# Patient Record
Sex: FEMALE | Race: WHITE | NOT HISPANIC OR LATINO | Employment: FULL TIME | URBAN - NONMETROPOLITAN AREA
[De-identification: names, ages, dates, MRNs, and addresses within clinical notes are randomized per-mention and may not be internally consistent; named-entity substitution may affect disease eponyms.]

---

## 2017-02-15 ENCOUNTER — COMMUNICATION - GICH (OUTPATIENT)
Dept: FAMILY MEDICINE | Facility: OTHER | Age: 32
End: 2017-02-15

## 2017-02-15 DIAGNOSIS — F40.01 AGORAPHOBIA WITH PANIC DISORDER: ICD-10-CM

## 2017-02-15 DIAGNOSIS — F41.9 ANXIETY DISORDER: ICD-10-CM

## 2017-03-20 ENCOUNTER — OFFICE VISIT - GICH (OUTPATIENT)
Dept: FAMILY MEDICINE | Facility: OTHER | Age: 32
End: 2017-03-20

## 2017-03-20 DIAGNOSIS — F41.9 ANXIETY DISORDER: ICD-10-CM

## 2017-03-20 DIAGNOSIS — F33.42 MAJOR DEPRESSIVE DISORDER, RECURRENT, IN FULL REMISSION (H): ICD-10-CM

## 2017-03-20 DIAGNOSIS — F40.01 AGORAPHOBIA WITH PANIC DISORDER: ICD-10-CM

## 2017-03-20 ASSESSMENT — ANXIETY QUESTIONNAIRES
GAD7 TOTAL SCORE: 0
1. FEELING NERVOUS, ANXIOUS, OR ON EDGE: NOT AT ALL
5. BEING SO RESTLESS THAT IT IS HARD TO SIT STILL: NOT AT ALL
7. FEELING AFRAID AS IF SOMETHING AWFUL MIGHT HAPPEN: NOT AT ALL
2. NOT BEING ABLE TO STOP OR CONTROL WORRYING: NOT AT ALL
6. BECOMING EASILY ANNOYED OR IRRITABLE: NOT AT ALL
4. TROUBLE RELAXING: NOT AT ALL
3. WORRYING TOO MUCH ABOUT DIFFERENT THINGS: NOT AT ALL

## 2017-03-20 ASSESSMENT — PATIENT HEALTH QUESTIONNAIRE - PHQ9: SUM OF ALL RESPONSES TO PHQ QUESTIONS 1-9: 0

## 2018-01-03 NOTE — NURSING NOTE
Patient Information     Patient Name MRN Bev Jacobs [id number] Female 1985      Nursing Note by Faby Penaloza at 3/20/2017  9:30 AM     Author:  Faby Penaloza Service:  (none) Author Type:  (none)     Filed:  3/20/2017  9:54 AM Encounter Date:  3/20/2017 Status:  Signed     :  Faby Penaloza            Patient presents today for a medication check. She states things are going well, no concerns.  Faby Penaloza LPN ....................  3/20/2017   9:32 AM

## 2018-01-03 NOTE — TELEPHONE ENCOUNTER
Patient Information     Patient Name MRN Bev Jacobs [id number] Female 1985      Telephone Encounter by Jesus Hernandez RN at 2/15/2017 11:51 AM     Author:  Jesus Hernandez RN Service:  (none) Author Type:  NURS- Registered Nurse     Filed:  2/15/2017 12:08 PM Encounter Date:  2/15/2017 Status:  Signed     :  Jesus Hernandez RN (NURS- Registered Nurse)            Last office visit with PCP to address continued use of prozac noted to be on 2016. Plan as follows:    continue with fluoxetine 20 mg daily. Okay to use one half to one tablet Klonopin at bedtime as it seems to mitigate her nighttime anxiety. Weaned down on the Klonopin over the course of the next month and then leave the fluoxetine at the stable dose for at least 6 months with follow-up in the next 3-6 months, sooner if needed    Current rx in chart not reflective of this dosing, however patient is also not due for a refill of paxil at this time either as per last refill in chart as listed below:    Prescribing Provider: Karyna Ortiz MD                 Order Date: 2016  Ordered by: KARYNA ORTIZ V  Medication:FLUoxetine (PROZAC) 10 mg capsule    Qty:90 capsule  Ref:11  Start:2016    End:              Route:Oral                  CRIS:No   Class:eRx    Sig:Take 3 capsules by mouth every morning.    Patient taking differently: Take 20 mg by mouth at bedtime.    Pharmacy:[address]    Writer will refuse requested rx as redundant and send patient a reminder letter as she is due for a follow up visit.     Unable to complete prescription refill per RN Medication Refill Policy.................... Jesus Hernandez RN ....................  2/15/2017   11:58 AM

## 2018-01-03 NOTE — PROGRESS NOTES
Patient Information     Patient Name MRN Bev Jacobs [id number] Female 1985      Progress Notes by Alfred Ortiz MD at 3/20/2017  9:30 AM     Author:  Alfred Ortiz MD Service:  (none) Author Type:  Physician     Filed:  3/20/2017 10:11 AM Encounter Date:  3/20/2017 Status:  Signed     :  Alfred Ortiz MD (Physician)            Nursing Notes:   Faby Penaloza  3/20/2017  9:54 AM  Signed  Patient presents today for a medication check. She states things are going well, no concerns.  Faby Penaloza LPN ....................  3/20/2017   9:32 AM      SUBJECTIVE:  Bev KOHLER  is a 31 y.o. female who comes in today for follow-up and medication check. She is doing well and has no specific concerns. She is on fluoxetine daily. She also uses Klonopin when necessary. She last filled Klonopin in late 2016.     PHQ Depression Screening 2016 3/20/2017   Date of PHQ exam (doc flow) 2016 3/20/2017   1. Lack of interest/pleasure - 0 - Not at all   2. Feeling down/depressed - 0 - Not at all   PHQ-2 TOTAL SCORE - 0   3. Trouble sleeping - 0 - Not at all   4. Decreased energy - 0 - Not at all   5. Appetite change - 0 - Not at all   6. Feelings of failure - 0 - Not at all   7. Trouble concentrating - 0 - Not at all   8. Activity level - 0 - Not at all   9. Hurting yourself - 0 - Not at all   PHQ-9 TOTAL SCORE - 0   PHQ-9 Severity Level - none   Functional Impairment - not applicable      LAI-7 ANXIETY SCREENING 2016 3/20/2017   LAI date (doc flow) 2016 3/20/2017   Nervous, anxious 1 0   Cannot stop worrying 1 0   Worry about different things 1 0   Cannot relax 1 0   Feeling restless 1 0   Easily annoyed/irritated 1 0   Afraid of awful event 1 0   Score 7 0   Severity mild anxiety none        She is up-to-date on health maintenance issues. She is status post hysterectomy. She is up-to-date on immunizations with the exception of TDAP. Did get her flu shot this  year.    She fell over the weekend and has been taking Flexeril.     Past Medical, Family, and Social History reviewed and updated as noted below.   ROS is negative except as noted above       Allergies     Allergen  Reactions     Hydrocodone-Acetaminophen Shortness Of Breath and Anxiety     Amoxicillin Rash     Codeine Nausea Only     Pcn [Penicillins] Rash     Piperacillin-Tazobactam Rash   , No family history on file.,   Current Outpatient Prescriptions on File Prior to Visit       Medication  Sig Dispense Refill     clonazePAM (KLONOPIN) 0.5 mg tablet Take 1/2-1 tablet by mouth twice daily prn 60 tablet 2     multivitamin (MVI) tablet Take 1 tablet by mouth once daily.  0     ondansetron (ZOFRAN ODT) 4 mg disintegrating tablet Place 1 tablet on the tongue every 8 hours if needed for Nausea/Vomiting. 30 tablet 0     No current facility-administered medications on file prior to visit.    ,   Past Medical History      Diagnosis   Date     Atrial septal defect       Venous-type      Hx of pregnancy             Lump or mass in breast       Pain in joint, upper arm       Strep pharyngitis       intermittent    ,   Patient Active Problem List      Diagnosis Date Noted     Panic disorder with agoraphobia 2016     Vitamin D insufficiency 2015     Depression, acute 2015     Anxiety 2015     Vasovagal near syncope 2015     Chest pain, unspecified 2015     Painful respiration 2015     Migraine without aura, without mention of intractable migraine without mention of status migrainosus 2015     Enlargement of lymph nodes 2015     Other malaise and fatigue 2015     Postoperative state 2015     Pelvic abscess in female 2015     GERD (gastroesophageal reflux disease) 2014     H/O atrial septal defect repair 2014   ,   Past Surgical History       Procedure   Laterality Date     Pericardial patch        repair of a sinus/venus type  atrioseptal defect       Ovarian cystectomy   2013     Dr. Martinez, [facility]       Pr lap tubal cautery   3/10/2015             Exploratory laparotomy, release of small bowel obstruction and drainage of pelvic abscess   3/14/2015     Appendectomy   3/10/15     Appendectomy       Tubal ligation   3/10/15    and   Social History        Substance Use Topics          Smoking status:   Light Tobacco Smoker      Packs/day:  0.10      Types:  Cigarettes      Smokeless tobacco:   Never Used      Alcohol use   0.0 oz/week     0 Standard drinks or equivalent per week        Comment: occ       OBJECTIVE:  Visit Vitals       /70     Pulse 58     Wt 62 kg (136 lb 9.6 oz)     LMP 02/19/2016 (Exact Date)     BMI 23.45 kg/m2      EXAM:  General Appearance: Pleasant, alert, appropriate appearance for age. No acute distress  Head Exam: Normal. Normocephalic, atraumatic.  Eye Exam: PERRLA, EOMI, conjunctiva, sclerae normal.  Ear Exam: Normal TM's bilaterally. Normal auditory canals and external ears. Non-tender.  Nose Exam: Normal external nose, mucus membranes, and septum.  OroPharynx Exam:  Dental hygiene adequate. Normal buccal mucose. Normal pharynx.  Neck Exam:  Supple, no masses or nodes. No bruits  Thyroid Exam: No nodules or enlargement.  Chest/Respiratory Exam: Normal chest wall and respirations. Clear to auscultation.  Cardiovascular Exam: Regular rate and rhythm. S1, S2, no murmur, click, gallop, or rubs.  Gastrointestinal Exam: Soft, non-tender, no masses or organomegaly.  Lymphatic Exam: Non-palpable nodes in neck, clavicular regions.  Musculoskeletal Exam: Back is straight and non-tender, full ROM of upper and lower extremities.  Foot Exam: Left and right foot: good pedal pulses  Skin: no rash or abnormalities  Neurologic Exam:  normal gross motor, tone coordination and no tremor.  Psychiatric Exam: Alert and oriented - appropriate affect. No formal thought disorder.   ASSESSMENT/Plan :      Bev was seen  today for medication management.    Diagnoses and all orders for this visit:    Anxiety  -     FLUoxetine (PROZAC) 20 mg capsule; Take 1 capsule by mouth every morning.    Panic disorder with agoraphobia  -     FLUoxetine (PROZAC) 20 mg capsule; Take 1 capsule by mouth every morning.    Recurrent major depressive disorder, in full remission (HC)    Continue current medications. Discussed diet, exercise and healthy lifestyle changes.     Follow up in one year, sooner prn.     A total of 15 minutes was spent with the patient, greater than 50% of the time was spent in counseling/discussion of the aforementioned concerns.       Alfred Ortiz MD

## 2018-01-03 NOTE — TELEPHONE ENCOUNTER
Patient Information     Patient Name MRN Bev Jacobs [id number] Female 1985      Telephone Encounter by Jesus Hernandez RN at 2/15/2017 12:23 PM     Author:  Jesus Hernandez RN Service:  (none) Author Type:  NURS- Registered Nurse     Filed:  2/15/2017 12:40 PM Encounter Date:  2/15/2017 Status:  Signed     :  Jesus Hernandez RN (NURS- Registered Nurse)            Received additional request for patient's prozac as listed in encounter. Writer had just spoken to [place] pharmacist at [facility] about requesting a transfer from [facility] for this medication. See previous refill request. Call placed to [place] at [facility], who advised writer that previous rx for prozac 10 mg capsules written on 16 had been transferred out of [facility] per their report, and that he couldn't obtain a transfer. Writer will refill rx as requested for a limited supply at prozac 20 mg-1 capsule daily as per office visit note instructions on 16. Reminder letter previously sent to patient to day with previous encounter.    Depression-in adults 18 and over  SSRI    Office visit in the past 12 months or as indicated in chart.  Should have clinic visit 1-2 months after initial prescription.    Last visit with KARYNA WEINSTEIN was on: 2016 in [facility]  Next visit with KARYNA WEINSTEIN is on: No future appointment listed with this provider  Next visit with Family Practice is on: No future appointment listed in this department    Max refills 12 months from last office visit or per providers notes.    PHQ Depression Screening 2016   Date of PHQ exam (doc flow) 2016   1. Lack of interest/pleasure 0 - Not at all -   2. Feeling down/depressed 1 - Several days -   PHQ-2 TOTAL SCORE 1 -   3. Trouble sleeping 0 - Not at all -   4. Decreased energy 1 - Several days -   5. Appetite change 0 - Not at all -   6. Feelings of failure 1 - Several days -   7. Trouble concentrating 1 - Several days -   8.  Activity level 0 - Not at all -   9. Hurting yourself 0 - Not at all -   PHQ-9 TOTAL SCORE 4 -   PHQ-9 Severity Level none -   Functional Impairment somewhat difficult -     Prescription refilled per RN Medication Refill Policy.................... Jesus Hernandez RN ....................  2/15/2017   12:37 PM

## 2018-01-03 NOTE — TELEPHONE ENCOUNTER
Patient Information     Patient Name MRN Sex Bev SHRESTHA [id number] Female 1985      Telephone Encounter by Jesus Hernandez RN at 2/15/2017 12:04 PM     Author:  Jesus Hernandez RN Service:  (none) Author Type:  NURS- Registered Nurse     Filed:  2/15/2017 12:08 PM Encounter Date:  2/15/2017 Status:  Signed     :  Jesus Hernandez RN (NURS- Registered Nurse)            After writer had refused refill request for prozac, noted that [facility]. Previous rx was sent to [facility] in [place]. Call placed to [facility]. Spoke with [place], pharmacist. Requested that he obtain a transfer from [facility] in [employer]. [place] stated understanding.    Unable to complete prescription refill per RN Medication Refill Policy.................... Jesus Hernandez RN ....................  2/15/2017   12:08 PM

## 2018-01-16 RX ORDER — ONDANSETRON 4 MG/1
4 TABLET, ORALLY DISINTEGRATING ORAL
COMMUNITY
Start: 2016-10-24 | End: 2018-08-13

## 2018-01-16 RX ORDER — CLONAZEPAM 0.5 MG/1
TABLET ORAL
COMMUNITY
Start: 2016-06-07 | End: 2018-08-13

## 2018-01-16 RX ORDER — DIPHENOXYLATE HYDROCHLORIDE AND ATROPINE SULFATE 2.5; .025 MG/1; MG/1
1 TABLET ORAL
COMMUNITY
Start: 2016-01-25 | End: 2018-08-13

## 2018-01-26 VITALS — HEART RATE: 58 BPM | DIASTOLIC BLOOD PRESSURE: 70 MMHG | SYSTOLIC BLOOD PRESSURE: 115 MMHG | WEIGHT: 136.6 LBS

## 2018-02-01 ASSESSMENT — ANXIETY QUESTIONNAIRES: GAD7 TOTAL SCORE: 0

## 2018-02-01 ASSESSMENT — PATIENT HEALTH QUESTIONNAIRE - PHQ9: SUM OF ALL RESPONSES TO PHQ QUESTIONS 1-9: 0

## 2018-03-25 ENCOUNTER — HEALTH MAINTENANCE LETTER (OUTPATIENT)
Age: 33
End: 2018-03-25

## 2018-04-30 ENCOUNTER — APPOINTMENT (OUTPATIENT)
Dept: GENERAL RADIOLOGY | Facility: OTHER | Age: 33
End: 2018-04-30
Attending: FAMILY MEDICINE
Payer: MEDICAID

## 2018-04-30 ENCOUNTER — HOSPITAL ENCOUNTER (EMERGENCY)
Facility: OTHER | Age: 33
Discharge: HOME | End: 2018-05-01
Admitting: PHYSICIAN ASSISTANT
Payer: MEDICAID

## 2018-04-30 DIAGNOSIS — S49.92XA ARM INJURY, LEFT, INITIAL ENCOUNTER: ICD-10-CM

## 2018-04-30 PROCEDURE — 99284 EMERGENCY DEPT VISIT MOD MDM: CPT | Mod: 25 | Performed by: PHYSICIAN ASSISTANT

## 2018-04-30 PROCEDURE — 73090 X-RAY EXAM OF FOREARM: CPT | Mod: LT

## 2018-04-30 PROCEDURE — 99283 EMERGENCY DEPT VISIT LOW MDM: CPT | Mod: Z6 | Performed by: PHYSICIAN ASSISTANT

## 2018-04-30 PROCEDURE — 73080 X-RAY EXAM OF ELBOW: CPT | Mod: LT

## 2018-04-30 NOTE — ED AVS SNAPSHOT
[facility]    [address]    [place] [address]    Phone:  [phone]    Fax:  [phone]                                       Bev Shi   MRN: [id number]    Department:  [facility]   Date of Visit:  4/30/2018           After Visit Summary Signature Page     I have received my discharge instructions, and my questions have been answered. I have discussed any challenges I see with this plan with the nurse or doctor.    ..........................................................................................................................................  Patient/Patient Representative Signature      ..........................................................................................................................................  Patient Representative Print Name and Relationship to Patient    ..................................................               ................................................  Date                                            Time    ..........................................................................................................................................  Reviewed by Signature/Title    ...................................................              ..............................................  Date                                                            Time

## 2018-04-30 NOTE — ED AVS SNAPSHOT
[facility]    [address]    [place] [address]    Phone:  [phone]    Fax:  [phone]                                       Bev Shi   MRN: [id number]    Department:  [facility]   Date of Visit:  4/30/2018           Patient Information     Date Of Birth          1985        Your diagnoses for this visit were:     Arm injury, left, initial encounter       Follow-up Information     Follow up with Alfred Ortiz MD.    Specialty:  Family Practice    Contact information:    [address] [place] [address]  [phone]          Discharge Instructions       Splint for comfort. Ibuprofen 600-800 mg every 6 hours. Return for concerns.  Follow-up in clinic as needed.    24 Hour Appointment Hotline       To make an appointment at any [facility], call [phone] ([phone]). If you don't have a family doctor or clinic, we will help you find one. [facility] clinics are conveniently located to serve the needs of you and your family.             Review of your medicines      Our records show that you are taking the medicines listed below. If these are incorrect, please call your family doctor or clinic.        Dose / Directions Last dose taken    clonazePAM 0.5 MG tablet   Commonly known as:  klonoPIN        Take 1/2-1 tablet by mouth twice daily prn   Refills:  0        FLUoxetine 20 MG capsule   Commonly known as:  PROzac   Dose:  20 mg        Take 20 mg by mouth   Refills:  0        MULTI-VITAMINS Tabs   Dose:  1 tablet        Take 1 tablet by mouth   Refills:  0        ondansetron 4 MG ODT tab   Commonly known as:  ZOFRAN-ODT   Dose:  4 mg        Place 4 mg under the tongue   Refills:  0                Procedures and tests performed during your visit     XR Elbow Left G/E 3 Views    XR Forearm Left 2 Views      Orders Needing Specimen Collection     None      Pending Results     Date and Time Order Name Status  "Description    2018 2323 XR Elbow Left G/E 3 Views In process     2018 2323 XR Forearm Left 2 Views In process             Pending Culture Results     No orders found for last 3 day(s).            Pending Results Instructions     If you had any lab results that were not finalized at the time of your Discharge, you can call the ED Lab Result RN at [phone]. You will be contacted by this team for any positive Lab results or changes in treatment. The nurses are available 7 days a week from 10A to 6:30P.  You can leave a message 24 hours per day and they will return your call.        Thank you for choosing [facility]       Thank you for choosing [facility] for your care. Our goal is always to provide you with excellent care. Hearing back from our patients is one way we can continue to improve our services. Please take a few minutes to complete the written survey that you may receive in the mail after you visit with us. Thank you!        [employer] Information     [facility] lets you send messages to your doctor, view your test results, renew your prescriptions, schedule appointments and more. To sign up, go to [email or web address] . Click on \"Log in\" on the left side of the screen, which will take you to the Welcome page. Then click on \"Sign up Now\" on the right side of the page.     You will be asked to enter the access code listed below, as well as some personal information. Please follow the directions to create your username and password.     Your access code is: [email or web address]  Expires: 2018 12:18 AM     Your access code will  in 90 days. If you need help or a new code, please call your [facility] clinic or [phone].        Care EveryWhere ID     This is your Care EveryWhere ID. This could be used by other organizations to access your [facility] medical records  [id number]        Equal Access to Services     JORGITO BLANTON : sotero Doll qaybta kaalmada adeegyada, " riya zavala'aan ah. So [facility] [phone].    ATENCIÓN: Si habla español, tiene a del valle disposición servicios gratuitos de asistencia lingüística. Llame al [phone].    We comply with applicable federal civil rights laws and [place] laws. We do not discriminate on the basis of race, color, national origin, age, disability, sex, sexual orientation, or gender identity.            After Visit Summary       This is your record. Keep this with you and show to your community pharmacist(s) and doctor(s) at your next visit.

## 2018-05-01 VITALS
SYSTOLIC BLOOD PRESSURE: 136 MMHG | RESPIRATION RATE: 18 BRPM | HEIGHT: 65 IN | DIASTOLIC BLOOD PRESSURE: 75 MMHG | TEMPERATURE: 97.3 F | OXYGEN SATURATION: 100 %

## 2018-05-01 NOTE — DISCHARGE INSTRUCTIONS
Splint for comfort. Ibuprofen 600-800 mg every 6 hours. Return for concerns.  Follow-up in clinic as needed.

## 2018-05-01 NOTE — ED PROVIDER NOTES
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  She has numbness and tingling on the ulnar aspect of the arm down to the left pinky.  He has pain with pronation supination of the hand.  Pain with flexion and extension of the wrist.  This is all left-sided.  She denies any neck pain headache or loss of consciousness.    Problem List:    Patient Active Problem List    Diagnosis Date Noted     Panic disorder with agoraphobia 01/25/2016     Priority: Medium     Vitamin D insufficiency 12/28/2015     Priority: Medium     Anxiety 12/07/2015     Priority: Medium     Depression, acute 12/07/2015     Priority: Medium     Vasovagal near syncope 12/07/2015     Priority: Medium     Chest pain 07/09/2015     Priority: Medium     Enlarged lymph nodes 07/09/2015     Priority: Medium     Migraine without aura 07/09/2015     Priority: Medium     Malaise and fatigue 07/09/2015     Priority: Medium     Painful respiration 07/09/2015     Priority: Medium     Postoperative state 03/23/2015     Priority: Medium     Pelvic abscess in female 03/14/2015     Priority: Medium     GERD (gastroesophageal reflux disease) 04/30/2014     Priority: Medium     H/O atrial septal defect repair 04/04/2014     Priority: Medium        Past Medical History:    Past Medical History:   Diagnosis Date     Atrial septal defect      Breast lump      Pain in elbow      Personal history of other medical treatment (CODE)      Streptococcal pharyngitis        Past Surgical History:    Past Surgical History:   Procedure Laterality Date     APPENDECTOMY OPEN      3/10/15,Appendectomy     LAPAROSCOPIC TUBAL LIGATION      3/10/15     OTHER SURGICAL HISTORY       "11/98,715108,OTHER,repair of a sinus/venus type atrioseptal defect     OTHER SURGICAL HISTORY      2013,403202,OTHER,Dr. Martinez, [facility]     OTHER SURGICAL HISTORY      3/10/2015,54271.0,[place] LAP TUBAL CAUTERY     OTHER SURGICAL HISTORY      3/14/2015,634644,OTHER       Family History:    No family history on file.    Social History:  Marital Status:   [4]  Social History   Substance Use Topics     Smoking status: Light Tobacco Smoker     Packs/day: 0.10     Types: Cigarettes     Smokeless tobacco: Never Used     Alcohol use 0.0 oz/week      Comment: Alcoholic Drinks/day: occ        Medications:      clonazePAM (KLONOPIN) 0.5 MG tablet   FLUoxetine (PROZAC) 20 MG capsule   Multiple Vitamin (MULTI-VITAMINS) TABS   ondansetron (ZOFRAN-ODT) 4 MG ODT tab         Review of Systems  HPI  Physical Exam   BP: 142/82  Heart Rate: 76  Temp: 97.3  F (36.3  C)  Resp: 16  Height: 165.1 cm (5' 5\")  SpO2: 100 %      Physical Exam   Constitutional: She is oriented to person, place, and time. She appears well-developed and well-nourished. No distress.   Neck:   No midline cervical spinal tenderness   Cardiovascular: Normal rate.    Pulmonary/Chest: Effort normal. No respiratory distress.   Musculoskeletal: She exhibits tenderness. She exhibits no edema or deformity.   Range of motion is limited pain.  She she is tender in the canal where the ulnar nerve and pass along the elbow.  She has some paresthesia to the fifth digit and fourth digit as well.   Neurological: She is alert and oriented to person, place, and time.   Skin: Skin is warm and dry. She is not diaphoretic.   Psychiatric: She has a normal mood and affect. Her behavior is normal. Judgment and thought content normal.   Nursing note and vitals reviewed.      ED Course   She was examined here in the ED.  We did do x-rays of the forearm and elbow that both negative.  Her tenderness is basic basically at the wrist area.  She placed a Velcro splint.  Take " ibuprofen as well.  Ice elevate rest.  ED Course     Procedures                 No results found for this or any previous visit (from the past 24 hour(s)).    Medications - No data to display    Assessments & Plan (with Medical Decision Making)     I have reviewed the nursing notes.    I have reviewed the findings, diagnosis, plan and need for follow up with the patient.      New Prescriptions    No medications on file       Final diagnoses:   Arm injury, left, initial encounter       4/30/2018   [facility]  05/01/18 0048

## 2018-05-01 NOTE — ED TRIAGE NOTES
Patient presents ambulatory to ER. She reports numbness and tingling that began in her L forearm and has since spread from her mid upper arm to her hand. 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

## 2018-07-23 NOTE — PROGRESS NOTES
Patient Information     Patient Name  Bev KOHLER MRN  [id number] Sex  Female   1985      Letter by Alfred Ortiz MD at      Author:  Alfred Ortiz MD Service:  (none) Author Type:  (none)    Filed:   Encounter Date:  2/15/2017 Status:  (Other)           Bev KOHLER  [address]   [place] [address]          February 15, 2017    Dear Ms. KOHLER:    This is to remind you that you are due for your 6 month follow-up appointment with Alfred Ortiz MD in relation to anxiety and continued use of prozac.  Your last visit was on 2016. Additional refills of your medication require you to complete this visit.    Please call [phone] to schedule your appointment.    Thank you for choosing [facility] for your health care needs.    Sincerely,      Refill RN  [facility]

## 2018-08-13 ENCOUNTER — OFFICE VISIT (OUTPATIENT)
Dept: FAMILY MEDICINE | Facility: OTHER | Age: 33
End: 2018-08-13
Attending: FAMILY MEDICINE
Payer: COMMERCIAL

## 2018-08-13 VITALS
BODY MASS INDEX: 22.57 KG/M2 | SYSTOLIC BLOOD PRESSURE: 100 MMHG | DIASTOLIC BLOOD PRESSURE: 82 MMHG | HEART RATE: 72 BPM | WEIGHT: 135.6 LBS

## 2018-08-13 DIAGNOSIS — F43.21 ADJUSTMENT DISORDER WITH DEPRESSED MOOD: ICD-10-CM

## 2018-08-13 DIAGNOSIS — F40.01 PANIC DISORDER WITH AGORAPHOBIA: ICD-10-CM

## 2018-08-13 DIAGNOSIS — Z23 NEED FOR DIPHTHERIA-TETANUS-PERTUSSIS (TDAP) VACCINE, ADULT/ADOLESCENT: ICD-10-CM

## 2018-08-13 DIAGNOSIS — R11.0 NAUSEA: ICD-10-CM

## 2018-08-13 DIAGNOSIS — F41.9 ANXIETY: ICD-10-CM

## 2018-08-13 DIAGNOSIS — F33.41 RECURRENT MAJOR DEPRESSION IN PARTIAL REMISSION (H): Primary | ICD-10-CM

## 2018-08-13 PROCEDURE — 90715 TDAP VACCINE 7 YRS/> IM: CPT | Performed by: FAMILY MEDICINE

## 2018-08-13 PROCEDURE — 90471 IMMUNIZATION ADMIN: CPT | Performed by: FAMILY MEDICINE

## 2018-08-13 PROCEDURE — 99213 OFFICE O/P EST LOW 20 MIN: CPT | Mod: 25 | Performed by: FAMILY MEDICINE

## 2018-08-13 PROCEDURE — G0463 HOSPITAL OUTPT CLINIC VISIT: HCPCS | Performed by: FAMILY MEDICINE

## 2018-08-13 RX ORDER — CLONAZEPAM 0.5 MG/1
TABLET ORAL
Qty: 30 TABLET | Refills: 5 | Status: SHIPPED | OUTPATIENT
Start: 2018-08-13 | End: 2019-01-02

## 2018-08-13 RX ORDER — ONDANSETRON 4 MG/1
4 TABLET, ORALLY DISINTEGRATING ORAL EVERY 8 HOURS PRN
Qty: 20 TABLET | Refills: 1 | Status: SHIPPED | OUTPATIENT
Start: 2018-08-13 | End: 2020-06-01

## 2018-08-13 ASSESSMENT — PATIENT HEALTH QUESTIONNAIRE - PHQ9: 5. POOR APPETITE OR OVEREATING: SEVERAL DAYS

## 2018-08-13 ASSESSMENT — PAIN SCALES - GENERAL: PAINLEVEL: NO PAIN (0)

## 2018-08-13 ASSESSMENT — ANXIETY QUESTIONNAIRES
GAD7 TOTAL SCORE: 8
2. NOT BEING ABLE TO STOP OR CONTROL WORRYING: SEVERAL DAYS
IF YOU CHECKED OFF ANY PROBLEMS ON THIS QUESTIONNAIRE, HOW DIFFICULT HAVE THESE PROBLEMS MADE IT FOR YOU TO DO YOUR WORK, TAKE CARE OF THINGS AT HOME, OR GET ALONG WITH OTHER PEOPLE: SOMEWHAT DIFFICULT
1. FEELING NERVOUS, ANXIOUS, OR ON EDGE: SEVERAL DAYS
3. WORRYING TOO MUCH ABOUT DIFFERENT THINGS: NEARLY EVERY DAY
5. BEING SO RESTLESS THAT IT IS HARD TO SIT STILL: NOT AT ALL
7. FEELING AFRAID AS IF SOMETHING AWFUL MIGHT HAPPEN: SEVERAL DAYS
6. BECOMING EASILY ANNOYED OR IRRITABLE: SEVERAL DAYS

## 2018-08-13 NOTE — MR AVS SNAPSHOT
"              After Visit Summary   8/13/2018    Bev Shi    MRN: [id number]           Patient Information     Date Of Birth          1985        Visit Information        Provider Department      8/13/2018 1:00 PM Alfred Ortiz MD [facility]        Today's Diagnoses     Recurrent major depression in partial remission (H)    -  1    Anxiety        Panic disorder with agoraphobia        Adjustment disorder with depressed mood        Nausea        Need for diphtheria-tetanus-pertussis (Tdap) vaccine, adult/adolescent           Follow-ups after your visit        Who to contact     If you have questions or need follow up information about today's clinic visit or your schedule please contact [facility] directly at [phone].  Normal or non-critical lab and imaging results will be communicated to you by M[employer]art, letter or phone within 4 business days after the clinic has received the results. If you do not hear from us within 7 days, please contact the clinic through Miralupahart or phone. If you have a critical or abnormal lab result, we will notify you by phone as soon as possible.  Submit refill requests through [employer] or call your pharmacy and they will forward the refill request to us. Please allow 3 business days for your refill to be completed.          Additional Information About Your Visit        MyChart Information     [employer] lets you send messages to your doctor, view your test results, renew your prescriptions, schedule appointments and more. To sign up, go to [email or web address] . Click on \"Log in\" on the left side of the screen, which will take you to the Welcome page. Then click on \"Sign up Now\" on the right side of the page.     You will be asked to enter the access code listed below, as well as some personal information. Please follow the directions to create your username and password.     Your access code is: [email or web address]  Expires: " 2018  5:55 PM     Your access code will  in 90 days. If you need help or a new code, please call your [facility] clinic or [phone].        Care EveryWhere ID     This is your Care EveryWhere ID. This could be used by other organizations to access your [facility] medical records  [id number]        Your Vitals Were     Pulse Breastfeeding? BMI (Body Mass Index)             72 No 22.57 kg/m2          Blood Pressure from Last 3 Encounters:   18 100/82   18 136/75   17 115/70    Weight from Last 3 Encounters:   18 135 lb 9.6 oz (61.5 kg)   17 136 lb 9.6 oz (62 kg)   16 125 lb (56.7 kg)              We Performed the Following     GH IMM-  TDAP VACCINE (BOOSTRIX )          Today's Medication Changes          These changes are accurate as of 18  5:55 PM.  If you have any questions, ask your nurse or doctor.               These medicines have changed or have updated prescriptions.        Dose/Directions    * FLUoxetine 20 MG capsule   Commonly known as:  PROzac   This may have changed:  Another medication with the same name was added. Make sure you understand how and when to take each.   Changed by:  Alfred Ortiz MD        Dose:  20 mg   Take 20 mg by mouth   Refills:  0       * FLUoxetine 20 MG capsule   Commonly known as:  PROzac   This may have changed:  You were already taking a medication with the same name, and this prescription was added. Make sure you understand how and when to take each.   Used for:  Recurrent major depression in partial remission (H), Anxiety, Panic disorder with agoraphobia   Changed by:  Alfred Ortiz MD        Dose:  20 mg   Take 1 capsule (20 mg) by mouth daily   Quantity:  90 capsule   Refills:  11       ondansetron 4 MG ODT tab   Commonly known as:  ZOFRAN-ODT   This may have changed:    - when to take this  - reasons to take this   Used for:  Nausea   Changed by:  Alfred Ortiz MD        Dose:  4 mg   Place 1 tablet (4 mg)  under the tongue every 8 hours as needed for nausea   Quantity:  20 tablet   Refills:  1       * Notice:  This list has 2 medication(s) that are the same as other medications prescribed for you. Read the directions carefully, and ask your doctor or other care provider to review them with you.         Where to get your medicines      These medications were sent to [id number] - [place], [place] - [address]  [address], [facility] [address]     Phone:  [phone]     FLUoxetine 20 MG capsule    ondansetron 4 MG ODT tab         Some of these will need a paper prescription and others can be bought over the counter.  Ask your nurse if you have questions.     Bring a paper prescription for each of these medications     clonazePAM 0.5 MG tablet                Primary Care Provider Office Phone # Fax #    Alfred Ortiz [phone]       [address]  [facility] [address]        Equal Access to Services     JORGITO BLANTON AH: Hadii aad ku hadasho Soomaali, waaxda luqadaha, qaybta kaalmada adeegyada, waxay orionin haylyssan chris mehta . So [facility] [phone].    ATENCIÓN: Si habla español, tiene a del valle disposición servicios gratuitos de asistencia lingüística. Llame al [phone].    We comply with applicable federal civil rights laws and [place] laws. We do not discriminate on the basis of race, color, national origin, age, disability, sex, sexual orientation, or gender identity.            Thank you!     Thank you for choosing [facility] [place]  for your care. Our goal is always to provide you with excellent care. Hearing back from our patients is one way we can continue to improve our services. Please take a few minutes to complete the written survey that you may receive in the mail after your visit with us. Thank you!             Your Updated Medication List - Protect others around you: Learn how to safely use, store and  throw away your medicines at www.disposemymeds.org.          This list is accurate as of 8/13/18  5:55 PM.  Always use your most recent med list.                   Brand Name Dispense Instructions for use Diagnosis    clonazePAM 0.5 MG tablet    klonoPIN    30 tablet    Take 1/2-1 tablet by mouth twice daily prn    Anxiety, Panic disorder with agoraphobia       * FLUoxetine 20 MG capsule    PROzac     Take 20 mg by mouth        * FLUoxetine 20 MG capsule    PROzac    90 capsule    Take 1 capsule (20 mg) by mouth daily    Recurrent major depression in partial remission (H), Anxiety, Panic disorder with agoraphobia       ondansetron 4 MG ODT tab    ZOFRAN-ODT    20 tablet    Place 1 tablet (4 mg) under the tongue every 8 hours as needed for nausea    Nausea       * Notice:  This list has 2 medication(s) that are the same as other medications prescribed for you. Read the directions carefully, and ask your doctor or other care provider to review them with you.

## 2018-08-13 NOTE — NURSING NOTE
"Chief Complaint   Patient presents with     Recheck Medication       Initial /82 (BP Location: Right arm, Patient Position: Chair, Cuff Size: Adult Regular)  Pulse 72  Wt 135 lb 9.6 oz (61.5 kg)  Breastfeeding? No  BMI 22.57 kg/m2 Estimated body mass index is 22.57 kg/(m^2) as calculated from the following:    Height as of 4/30/18: 5' 5\" (1.651 m).    Weight as of this encounter: 135 lb 9.6 oz (61.5 kg).  Medication Reconciliation: complete    Sindi Stacy LPN  "
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
## 2018-08-14 ASSESSMENT — ANXIETY QUESTIONNAIRES: GAD7 TOTAL SCORE: 8

## 2018-08-14 ASSESSMENT — PATIENT HEALTH QUESTIONNAIRE - PHQ9: SUM OF ALL RESPONSES TO PHQ QUESTIONS 1-9: 6

## 2019-01-02 ENCOUNTER — OFFICE VISIT (OUTPATIENT)
Dept: FAMILY MEDICINE | Facility: OTHER | Age: 34
End: 2019-01-02
Attending: FAMILY MEDICINE
Payer: COMMERCIAL

## 2019-01-02 VITALS
HEART RATE: 76 BPM | DIASTOLIC BLOOD PRESSURE: 66 MMHG | TEMPERATURE: 98.1 F | SYSTOLIC BLOOD PRESSURE: 106 MMHG | HEIGHT: 66 IN | RESPIRATION RATE: 16 BRPM | BODY MASS INDEX: 19.38 KG/M2 | WEIGHT: 120.6 LBS

## 2019-01-02 DIAGNOSIS — F40.01 PANIC DISORDER WITH AGORAPHOBIA: ICD-10-CM

## 2019-01-02 DIAGNOSIS — F33.1 MODERATE RECURRENT MAJOR DEPRESSION (H): Primary | ICD-10-CM

## 2019-01-02 DIAGNOSIS — F41.9 ANXIETY: ICD-10-CM

## 2019-01-02 PROCEDURE — G0463 HOSPITAL OUTPT CLINIC VISIT: HCPCS | Performed by: FAMILY MEDICINE

## 2019-01-02 PROCEDURE — 99213 OFFICE O/P EST LOW 20 MIN: CPT | Performed by: FAMILY MEDICINE

## 2019-01-02 RX ORDER — CLONAZEPAM 0.5 MG/1
TABLET ORAL
Qty: 60 TABLET | Refills: 5 | Status: SHIPPED | OUTPATIENT
Start: 2019-01-02 | End: 2020-06-01

## 2019-01-02 ASSESSMENT — ANXIETY QUESTIONNAIRES
IF YOU CHECKED OFF ANY PROBLEMS ON THIS QUESTIONNAIRE, HOW DIFFICULT HAVE THESE PROBLEMS MADE IT FOR YOU TO DO YOUR WORK, TAKE CARE OF THINGS AT HOME, OR GET ALONG WITH OTHER PEOPLE: SOMEWHAT DIFFICULT
5. BEING SO RESTLESS THAT IT IS HARD TO SIT STILL: NOT AT ALL
1. FEELING NERVOUS, ANXIOUS, OR ON EDGE: MORE THAN HALF THE DAYS
2. NOT BEING ABLE TO STOP OR CONTROL WORRYING: NEARLY EVERY DAY
GAD7 TOTAL SCORE: 8
7. FEELING AFRAID AS IF SOMETHING AWFUL MIGHT HAPPEN: SEVERAL DAYS
3. WORRYING TOO MUCH ABOUT DIFFERENT THINGS: SEVERAL DAYS
6. BECOMING EASILY ANNOYED OR IRRITABLE: NOT AT ALL

## 2019-01-02 ASSESSMENT — MIFFLIN-ST. JEOR: SCORE: 1268.79

## 2019-01-02 ASSESSMENT — PATIENT HEALTH QUESTIONNAIRE - PHQ9
5. POOR APPETITE OR OVEREATING: SEVERAL DAYS
SUM OF ALL RESPONSES TO PHQ QUESTIONS 1-9: 9

## 2019-01-02 ASSESSMENT — PAIN SCALES - GENERAL: PAINLEVEL: NO PAIN (0)

## 2019-01-02 NOTE — NURSING NOTE
"Chief Complaint   Patient presents with     Recheck Medication       Initial /66   Pulse 76   Temp 98.1  F (36.7  C) (Tympanic)   Resp 16   Ht 1.676 m (5' 6\")   Wt 54.7 kg (120 lb 9.6 oz)   Breastfeeding? No   BMI 19.47 kg/m   Estimated body mass index is 19.47 kg/m  as calculated from the following:    Height as of this encounter: 1.676 m (5' 6\").    Weight as of this encounter: 54.7 kg (120 lb 9.6 oz).  Medication Reconciliation: complete    Sindi Stacy LPN  "

## 2019-01-02 NOTE — PROGRESS NOTES
"Nursing Notes:   Sindi Stacy LPN  1/2/2019  9:27 AM  Signed  Chief Complaint   Patient presents with     Recheck Medication       Initial /66   Pulse 76   Temp 98.1  F (36.7  C) (Tympanic)   Resp 16   Ht 1.676 m (5' 6\")   Wt 54.7 kg (120 lb 9.6 oz)   Breastfeeding? No   BMI 19.47 kg/m    Estimated body mass index is 19.47 kg/m  as calculated from the following:    Height as of this encounter: 1.676 m (5' 6\").    Weight as of this encounter: 54.7 kg (120 lb 9.6 oz).  Medication Reconciliation: complete    Sindi Stacy LPN      SUBJECTIVE:  Bev Shi  is a 33 year old female who comes in today for medication recheck.  I last saw her in August. She had been off her antidepressant medication quite a while and had a lot of family stressors.  We resumed fluoxetine at that time as well as clonazepam as a rescue medication.    PHQ-9 SCORE 3/20/2017 8/13/2018 1/2/2019   PHQ-9 Total Score 0 6 9     LAI-7 SCORE 3/20/2017 8/13/2018 1/2/2019   Total Score 0 8 8       Her son Kwabena had a wrist injury from a bullying incident and had to have surgery. She feels better but has lost some weight.  She is taking Klonopin intermittently.     Her mom had new onset seizure disorder likely post concussive. She broke her arm and had surgery.  Later she ended up having seizures.       Past Medical, Family, and Social History reviewed and updated as noted below.   ROS is negative except as noted above       Allergies   Allergen Reactions     Hydrocodone Shortness Of Breath     Amoxicillin Rash     Codeine Nausea     Penicillins Rash     Piperacillin-Tazobactam In D5w Rash   , No family history on file.,   Current Outpatient Medications   Medication     clonazePAM (KLONOPIN) 0.5 MG tablet     FLUoxetine (PROZAC) 20 MG capsule     FLUoxetine (PROZAC) 20 MG capsule     ondansetron (ZOFRAN-ODT) 4 MG ODT tab     No current facility-administered medications for this visit.    ,   Past Medical History:   Diagnosis " "Date     Atrial septal defect     Venous-type     Breast lump     No Comments Provided     Pain in elbow     No Comments Provided     Personal history of other medical treatment (CODE)          Streptococcal pharyngitis     intermittent   ,   Patient Active Problem List    Diagnosis Date Noted     Moderate recurrent major depression (H) 2019     Priority: Medium     Panic disorder with agoraphobia 2016     Priority: Medium     Vitamin D insufficiency 2015     Priority: Medium     Anxiety 2015     Priority: Medium     Migraine without aura 2015     Priority: Medium     GERD (gastroesophageal reflux disease) 2014     Priority: Medium     H/O atrial septal defect repair 2014     Priority: Medium   ,   Past Surgical History:   Procedure Laterality Date     APPENDECTOMY OPEN      3/10/15,Appendectomy     LAPAROSCOPIC HYSTERECTOMY TOTAL, SALPINGECTOMY  2016    Left.  Dr. Martinez     LAPAROSCOPIC TUBAL LIGATION      3/10/15     OTHER SURGICAL HISTORY      ,276238,OTHER,repair of a sinus/venus type atrioseptal defect     OTHER SURGICAL HISTORY      ,302608,OTHER,Dr. Martinez, [facility]     OTHER SURGICAL HISTORY      3/10/2015,53284.0,CT LAP TUBAL CAUTERY     OTHER SURGICAL HISTORY      3/14/2015,503953,OTHER    and   Social History     Tobacco Use     Smoking status: Light Tobacco Smoker     Packs/day: 0.10     Types: Cigarettes     Smokeless tobacco: Never Used   Substance Use Topics     Alcohol use: Yes     Alcohol/week: 0.0 oz     Comment: Alcoholic Drinks/day: occ     OBJECTIVE:  /66   Pulse 76   Temp 98.1  F (36.7  C) (Tympanic)   Resp 16   Ht 1.676 m (5' 6\")   Wt 54.7 kg (120 lb 9.6 oz)   Breastfeeding? No   BMI 19.47 kg/m     EXAM:  Alert and cooperative, no distress.  No formal thought disorder.  No homicidal suicidal ideation affect appears appropriate.    ASSESSMENT/Plan :    Bev was seen today for recheck medication.    Diagnoses " and all orders for this visit:    Moderate recurrent major depression (H)    Anxiety    Panic disorder with agoraphobia      Support and encouragement offered with regard to her multiple psychosocial stressors.  Recommended we continue her on fluoxetine at the current dose.  She is not interested in changing.  Weight loss may be medication related versus stress related.  Discussed use of clonazepam as she has been and renewal is given on same.    A total of 15 minutes was spent with the patient, greater than 50% of the time was spent in counseling/discussion of the aforementioned concerns.     Alfred Ortiz MD

## 2019-01-03 ASSESSMENT — ANXIETY QUESTIONNAIRES: GAD7 TOTAL SCORE: 8

## 2019-12-26 ENCOUNTER — HOSPITAL ENCOUNTER (OUTPATIENT)
Dept: MRI IMAGING | Facility: OTHER | Age: 34
Discharge: HOME | End: 2019-12-26
Attending: ORTHOPAEDIC SURGERY | Admitting: FAMILY MEDICINE
Payer: COMMERCIAL

## 2019-12-26 DIAGNOSIS — M23.91 DERANGEMENT OF RIGHT KNEE: ICD-10-CM

## 2019-12-26 PROCEDURE — 73721 MRI JNT OF LWR EXTRE W/O DYE: CPT | Mod: RT

## 2020-06-01 ENCOUNTER — VIRTUAL VISIT (OUTPATIENT)
Dept: FAMILY MEDICINE | Facility: OTHER | Age: 35
End: 2020-06-01
Attending: FAMILY MEDICINE
Payer: COMMERCIAL

## 2020-06-01 VITALS — BODY MASS INDEX: 21.31 KG/M2 | WEIGHT: 132 LBS

## 2020-06-01 DIAGNOSIS — R11.0 NAUSEA: ICD-10-CM

## 2020-06-01 DIAGNOSIS — F33.1 MODERATE RECURRENT MAJOR DEPRESSION (H): Primary | ICD-10-CM

## 2020-06-01 DIAGNOSIS — F41.9 ANXIETY: ICD-10-CM

## 2020-06-01 DIAGNOSIS — F40.01 PANIC DISORDER WITH AGORAPHOBIA: ICD-10-CM

## 2020-06-01 DIAGNOSIS — F33.41 RECURRENT MAJOR DEPRESSION IN PARTIAL REMISSION (H): ICD-10-CM

## 2020-06-01 PROCEDURE — G0463 HOSPITAL OUTPT CLINIC VISIT: HCPCS | Mod: GT

## 2020-06-01 PROCEDURE — 99214 OFFICE O/P EST MOD 30 MIN: CPT | Mod: 95 | Performed by: FAMILY MEDICINE

## 2020-06-01 RX ORDER — CLONAZEPAM 0.5 MG/1
TABLET ORAL
Qty: 60 TABLET | Refills: 5 | Status: SHIPPED | OUTPATIENT
Start: 2020-06-01 | End: 2020-10-07

## 2020-06-01 RX ORDER — ONDANSETRON 4 MG/1
4 TABLET, ORALLY DISINTEGRATING ORAL EVERY 8 HOURS PRN
Qty: 20 TABLET | Refills: 1 | Status: SHIPPED | OUTPATIENT
Start: 2020-06-01 | End: 2021-09-28

## 2020-06-01 ASSESSMENT — PATIENT HEALTH QUESTIONNAIRE - PHQ9
5. POOR APPETITE OR OVEREATING: MORE THAN HALF THE DAYS
SUM OF ALL RESPONSES TO PHQ QUESTIONS 1-9: 15

## 2020-06-01 ASSESSMENT — ANXIETY QUESTIONNAIRES
1. FEELING NERVOUS, ANXIOUS, OR ON EDGE: NEARLY EVERY DAY
GAD7 TOTAL SCORE: 19
6. BECOMING EASILY ANNOYED OR IRRITABLE: NEARLY EVERY DAY
7. FEELING AFRAID AS IF SOMETHING AWFUL MIGHT HAPPEN: NEARLY EVERY DAY
3. WORRYING TOO MUCH ABOUT DIFFERENT THINGS: NEARLY EVERY DAY
2. NOT BEING ABLE TO STOP OR CONTROL WORRYING: NEARLY EVERY DAY
IF YOU CHECKED OFF ANY PROBLEMS ON THIS QUESTIONNAIRE, HOW DIFFICULT HAVE THESE PROBLEMS MADE IT FOR YOU TO DO YOUR WORK, TAKE CARE OF THINGS AT HOME, OR GET ALONG WITH OTHER PEOPLE: VERY DIFFICULT
5. BEING SO RESTLESS THAT IT IS HARD TO SIT STILL: MORE THAN HALF THE DAYS

## 2020-06-01 ASSESSMENT — PAIN SCALES - GENERAL: PAINLEVEL: NO PAIN (0)

## 2020-06-01 NOTE — LETTER
My Depression Action Plan  Name: Bev Shi   Date of Birth 1985  Date: 6/1/2020    My doctor: Alfred Ortiz   My clinic: [facility]  [address]  [place] [id number] [phone]          GREEN    ZONE   Good Control    What it looks like:     Things are going generally well. You have normal ups and downs. You may even feel depressed from time to time, but bad moods usually last less than a day.   What you need to do:  1. Continue to care for yourself (see self care plan)  2. Check your depression survival kit and update it as needed  3. Follow your physician s recommendations including any medication.  4. Do not stop taking medication unless you consult with your physician first.           YELLOW         ZONE Getting Worse    What it looks like:     Depression is starting to interfere with your life.     It may be hard to get out of bed; you may be starting to isolate yourself from others.    Symptoms of depression are starting to last most all day and this has happened for several days.     You may have suicidal thoughts but they are not constant.   What you need to do:     1. Call your care team. Your response to treatment will improve if you keep your care team informed of your progress. Yellow periods are signs an adjustment may need to be made.     2. Continue your self-care.  Just get dressed and ready for the day.  Don't give yourself time to talk yourself out of it.    3. Talk to someone in your support network.    4. Open up your Depression Self-Care Plan/Wellness Kit.           RED    ZONE Medical Alert - Get Help    What it looks like:     Depression is seriously interfering with your life.     You may experience these or other symptoms: You can t get out of bed most days, can t work or engage in other necessary activities, you have trouble taking care of basic hygiene, or basic responsibilities, thoughts of suicide or death that will  not go away, self-injurious behavior.     What you need to do:  1. Call your care team and request a same-day appointment. If they are not available (weekends or after hours) call your local crisis line, emergency room or 911.            Depression Self-Care Plan / Wellness Kit    Self-Care for Depression  Here s the deal. Your body and mind are really not as separate as most people think.  What you do and think affects how you feel and how you feel influences what you do and think. This means if you do things that people who feel good do, it will help you feel better.  Sometimes this is all it takes.  There is also a place for medication and therapy depending on how severe your depression is, so be sure to consult with your medical provider and/ or Behavioral Health Consultant if your symptoms are worsening or not improving.     In order to better manage my stress, I will:    Exercise  Get some form of exercise, every day. This will help reduce pain and release endorphins, the  feel good  chemicals in your brain. This is almost as good as taking antidepressants!  This is not the same as joining a gym and then never going! (they count on that by the way ) It can be as simple as just going for a walk or doing some gardening, anything that will get you moving.      Hygiene   Maintain good hygiene (get out of bed in the morning, make your bed, brush your teeth, take a shower, and get dressed like you were going to work, even if you are unemployed).  If your clothes don't fit try to get ones that do.    Diet  Strive to eat foods that are good for me, drink plenty of water, and avoid excessive sugar, caffeine, alcohol, and other mood-altering substances.  Some foods that are helpful in depression are: complex carbohydrates, B vitamins, flaxseed, fish or fish oil, fresh fruits and vegetables.    Psychotherapy  Agree to participate in Individual Therapy (if recommended).    Medication  If prescribed medications, I agree to  take them.  Missing doses can result in serious side effects.  I understand that drinking alcohol, or other illicit drug use, may cause potential side effects.  I will not stop my medication abruptly without first discussing it with my provider.    Staying Connected With Others  Stay in touch with my friends, family members, and my primary care provider/team.    Use your imagination  Be creative.  We all have a creative side; it doesn t matter if it s oil painting, sand castles, or mud pies! This will also kick up the endorphins.    Witness Beauty  (AKA stop and smell the roses) Take a look outside, even in mid-winter. Notice colors, textures. Watch the squirrels and birds.     Service to others  Be of service to others.  There is always someone else in need.  By helping others we can  get out of ourselves  and remember the really important things.  This also provides opportunities for practicing all the other parts of the program.    Humor  Laugh and be silly!  Adjust your TV habits for less news and crime-drama and more comedy.    Control your stress  Try breathing deep, massage therapy, biofeedback, and meditation. Find time to relax each day.     Crisis Text Line  http://www.crisistextline.org    The Crisis Text Line serves anyone, in any type of crisis, providing access to free, 24/7 support and information via the medium people already use and trust:    Here's how it works:  1.  Text 374-717 from anywhere in the [place], anytime, about any type of crisis.  2.  A live, trained Crisis Counselor receives the text and responds quickly.  3.  The volunteer Crisis Counselor will help you move from a 'hot moment to a cool moment'.    My support system    Clinic Contact:  Phone number:    Contact 1:  Phone number:    Contact 2:  Phone number:    Spiritism/:  Phone number:    Therapist:  Phone number:    Local crisis center:    Phone number:    Other community support:  Phone number:

## 2020-06-01 NOTE — NURSING NOTE
"Chief Complaint   Patient presents with     Recheck Medication   She had stopped her Prozac. She has been needing to take her Clonazepam twice daily all the time and noticed her depression and anxiety increasing. She start back on the Prozac 2 weeks ago.  Sindi Stacy LPN..................6/1/2020   1:45 PM      Initial Wt 59.9 kg (132 lb)   Breastfeeding No   BMI 21.31 kg/m   Estimated body mass index is 21.31 kg/m  as calculated from the following:    Height as of 1/2/19: 1.676 m (5' 6\").    Weight as of this encounter: 59.9 kg (132 lb).  Medication Reconciliation: complete    Sindi Stacy LPN  "

## 2020-06-01 NOTE — PROGRESS NOTES
"Bev Shi is a 34 year old female who is being evaluated via a billable video visit.      The patient has been notified of following:     \"This video visit will be conducted via a call between you and your physician/provider. We have found that certain health care needs can be provided without the need for an in-person physical exam.  This service lets us provide the care you need with a video conversation.  If a prescription is necessary we can send it directly to your pharmacy.  If lab work is needed we can place an order for that and you can then stop by our lab to have the test done at a later time.    Video visits are billed at different rates depending on your insurance coverage.  Please reach out to your insurance provider with any questions.    If during the course of the call the physician/provider feels a video visit is not appropriate, you will not be charged for this service.\"    Patient has given verbal consent for Video visit? Yes    How would you like to obtain your AVS? mail    Patient would like the video invitation sent by: Send to e-mail at: [email or web address]    Will anyone else be joining your video visit? No       Nursing Notes:   Sindi Stacy LPN  6/1/2020  1:45 PM  Signed  Chief Complaint   Patient presents with     Recheck Medication   She had stopped her Prozac. She has been needing to take her Clonazepam twice daily all the time and noticed her depression and anxiety increasing. She start back on the Prozac 2 weeks ago.  Sindi Stacy LPN..................6/1/2020   1:45 PM      Initial Wt 59.9 kg (132 lb)   Breastfeeding No   BMI 21.31 kg/m   Estimated body mass index is 21.31 kg/m  as calculated from the following:    Height as of 1/2/19: 1.676 m (5' 6\").    Weight as of this encounter: 59.9 kg (132 lb).  Medication Reconciliation: complete    Sindi Stacy LPN        Subjective     Bev Shi is a 34 year old female who presents today via video " visit for the following health issues:    KADIE Pablo is having a video visit today because of increasing depression and anxiety.  She had stopped her Prozac in January, but noticed then after a while she was having to take clonazepam twice daily nearly all the time.  She resumed the Prozac again 2 weeks ago. 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        Video Start Time: 2:10 PM      Patient Active Problem List   Diagnosis     Anxiety     GERD (gastroesophageal reflux disease)     H/O atrial septal defect repair     Migraine without aura     Panic disorder with agoraphobia     Vitamin D insufficiency     Moderate recurrent major depression (H)     Past Surgical History:   Procedure Laterality Date     APPENDECTOMY OPEN      3/10/15,Appendectomy     LAPAROSCOPIC HYSTERECTOMY TOTAL, SALPINGECTOMY  03/29/2016    Left.  Dr. Martinez     LAPAROSCOPIC TUBAL LIGATION      3/10/15     OTHER SURGICAL HISTORY      11/98,117822,OTHER,repair of a sinus/venus type atrioseptal defect     OTHER SURGICAL HISTORY      2013,462389,OTHER,Dr. Martinez, [facility]     OTHER SURGICAL HISTORY      3/10/2015,42686.0,[place] LAP TUBAL CAUTERY     OTHER SURGICAL HISTORY      3/14/2015,754599,OTHER       Social History     Tobacco Use     Smoking status: Light Tobacco Smoker     Packs/day: 0.10     Types: Cigarettes     Smokeless tobacco: Never Used   Substance Use Topics     Alcohol use: Yes     Alcohol/week: 0.0 standard drinks     Comment: Alcoholic Drinks/day: occ     History reviewed. No pertinent family history.      Current Outpatient Medications   Medication Sig Dispense Refill     clonazePAM (KLONOPIN) 0.5 MG  "tablet Take 1/2-1 tablet by mouth twice daily prn 60 tablet 5     FLUoxetine (PROZAC) 20 MG capsule Take 1 capsule (20 mg) by mouth daily 90 capsule 11     ondansetron (ZOFRAN-ODT) 4 MG ODT tab Place 1 tablet (4 mg) under the tongue every 8 hours as needed for nausea 20 tablet 1     Allergies   Allergen Reactions     Hydrocodone Shortness Of Breath     Amoxicillin Rash     Codeine Nausea     Penicillins Rash     Piperacillin-Tazobactam In D5w Rash       Reviewed and updated as needed this visit by Provider         Review of Systems   ROS is negative except as noted above         Objective    Wt 59.9 kg (132 lb)   Breastfeeding No   BMI 21.31 kg/m    Estimated body mass index is 21.31 kg/m  as calculated from the following:    Height as of 1/2/19: 1.676 m (5' 6\").    Weight as of this encounter: 59.9 kg (132 lb).  Physical Exam     GENERAL: Healthy, alert and no distress  EYES: Eyes grossly normal to inspection.  No discharge or erythema, or obvious scleral/conjunctival abnormalities.  RESP: No audible wheeze, cough, or visible cyanosis.  No visible retractions or increased work of breathing.    SKIN: Visible skin clear. No significant rash, abnormal pigmentation or lesions.  NEURO: Cranial nerves grossly intact.  Mentation and speech appropriate for age.  PSYCH: Mentation appears normal, affect normal/bright, judgement and insight intact, normal speech and appearance well-groomed.              Bev was seen today for recheck medication.    Diagnoses and all orders for this visit:    Moderate recurrent major depression (H)    Nausea  -     ondansetron (ZOFRAN-ODT) 4 MG ODT tab; Place 1 tablet (4 mg) under the tongue every 8 hours as needed for nausea    Panic disorder with agoraphobia  -     FLUoxetine (PROZAC) 20 MG capsule; Take 1 capsule (20 mg) by mouth daily  -     clonazePAM (KLONOPIN) 0.5 MG tablet; Take 1/2-1 tablet by mouth twice daily prn    Anxiety  -     FLUoxetine (PROZAC) 20 MG capsule; Take 1 " capsule (20 mg) by mouth daily  -     clonazePAM (KLONOPIN) 0.5 MG tablet; Take 1/2-1 tablet by mouth twice daily prn    Recurrent major depression in partial remission (H)  -     FLUoxetine (PROZAC) 20 MG capsule; Take 1 capsule (20 mg) by mouth daily      Lengthy discussion with regard to her current situation.  She has had multiple psychosocial stressors including working a lot of hours and feeling in for hourly employees that will come to work because they make more on unemployment and if they worked, working a second job, raising her kids and working from home, dealing with the COVID pandemic in her assisted living work situation.  Other stressors include loss of a pet and getting a new puppy in addition to all the stress with the kids having to homeschool/distance learning.  Support encouragement offered.  We will continue with fluoxetine 20 mg daily and renewed clonazepam on a as needed basis.  She is having some nausea from the fluoxetine as she is getting started again so we renewed her Zofran so she can get back to therapeutic level.  She will keep in touch with her progress    A total of 25 minutes was spent with the patient, greater than 50% of the time was spent in counseling/discussion of the aforementioned concerns.       Video-Visit Details    Type of service:  Video Visit    Video End Time:2:34 PM    Originating Location (pt. Location): Home    Distant Location (provider location):  [facility]     Platform used for Video Visit: [email or web address]    No follow-ups on file.       Alfred Ortiz MD

## 2020-06-02 ASSESSMENT — ANXIETY QUESTIONNAIRES: GAD7 TOTAL SCORE: 19

## 2020-07-07 ENCOUNTER — TELEPHONE (OUTPATIENT)
Dept: FAMILY MEDICINE | Facility: OTHER | Age: 35
End: 2020-07-07

## 2020-07-07 DIAGNOSIS — B00.9 HSV (HERPES SIMPLEX VIRUS) INFECTION: Primary | ICD-10-CM

## 2020-07-07 RX ORDER — VALACYCLOVIR HYDROCHLORIDE 1 G/1
2000 TABLET, FILM COATED ORAL 2 TIMES DAILY
Qty: 12 TABLET | Refills: 11 | Status: SHIPPED | OUTPATIENT
Start: 2020-07-07

## 2020-07-07 NOTE — TELEPHONE ENCOUNTER
After patient's name and date of birth verified the below information was given.    Laly Sifuentes LPN ---- 7/7/2020 1:09 PM

## 2020-07-07 NOTE — TELEPHONE ENCOUNTER
Patient called in regards to getting a medication for her stress cold sores she has been getting. Please call her back in regards to this. Thank you.Shannon Rosales on 7/7/2020 at 9:25 AM

## 2020-07-07 NOTE — TELEPHONE ENCOUNTER
Left message to call back....................  7/7/2020   9:35 AM  Laly Sifuentes LPN 7/7/2020   9:35 AM

## 2020-07-07 NOTE — TELEPHONE ENCOUNTER
Valtrex 2000 mg q 12 hours x 2 doses is effective if started at the very onset, but won't work if it is full blown.  I'll send prescription.  Alfred Ortiz MD on 7/7/2020 at 1:04 PM

## 2020-09-16 ENCOUNTER — TELEPHONE (OUTPATIENT)
Dept: FAMILY MEDICINE | Facility: OTHER | Age: 35
End: 2020-09-16

## 2020-09-16 DIAGNOSIS — F33.41 RECURRENT MAJOR DEPRESSION IN PARTIAL REMISSION (H): ICD-10-CM

## 2020-09-16 DIAGNOSIS — F41.9 ANXIETY: ICD-10-CM

## 2020-09-16 DIAGNOSIS — F40.01 PANIC DISORDER WITH AGORAPHOBIA: ICD-10-CM

## 2020-09-16 RX ORDER — FLUOXETINE 40 MG/1
40 CAPSULE ORAL DAILY
Qty: 30 CAPSULE | Refills: 11 | Status: SHIPPED | OUTPATIENT
Start: 2020-09-16 | End: 2020-11-03 | Stop reason: DRUGHIGH

## 2020-09-16 NOTE — TELEPHONE ENCOUNTER
She states she fells edgy and she has been using more of her PRN Klonopin for her anxiety than she wants to. She is wondering if she could increase her Prozac to 40 mg. If ok could you send in an Rx?  Sindi Stacy LPN..................9/16/2020   9:37 AM

## 2020-09-16 NOTE — TELEPHONE ENCOUNTER
Patient called wanting to schedule an appointment for a med follow up but since there is nothing this week she is wanting to know if [facility] will up her fluoxitine from 20mg to 40mg states the 20mg is not helping.     Leidy Sunshine on 9/16/2020 at 8:14 AM

## 2020-10-01 ENCOUNTER — TELEPHONE (OUTPATIENT)
Dept: FAMILY MEDICINE | Facility: OTHER | Age: 35
End: 2020-10-01

## 2020-10-01 DIAGNOSIS — F40.01 PANIC DISORDER WITH AGORAPHOBIA: ICD-10-CM

## 2020-10-01 DIAGNOSIS — R00.2 PALPITATIONS: ICD-10-CM

## 2020-10-01 DIAGNOSIS — R63.4 UNEXPLAINED WEIGHT LOSS: ICD-10-CM

## 2020-10-01 DIAGNOSIS — F41.9 ANXIETY: Primary | ICD-10-CM

## 2020-10-01 RX ORDER — BUSPIRONE HYDROCHLORIDE 15 MG/1
15 TABLET ORAL 2 TIMES DAILY
Qty: 60 TABLET | Refills: 11 | Status: SHIPPED | OUTPATIENT
Start: 2020-10-01 | End: 2020-11-03

## 2020-10-01 NOTE — TELEPHONE ENCOUNTER
She said her anxiety medication was increased. She still is having to use a prn medication to function. She is scattered brained and it is interfering with her work and home life. She talked to her mom and Thyroid issues are on both sides of her family. She is wondering if she could get some lab work done to see if this is causing the problems she is having?  Sindi Stacy LPN..................10/1/2020   9:45 AM

## 2020-10-01 NOTE — TELEPHONE ENCOUNTER
Patient called in wondering if they can do a thyroid check, not sure meds are working.  Please call to advise.  Homa Dowell on 10/1/2020 at 9:17 AM

## 2020-10-01 NOTE — TELEPHONE ENCOUNTER
Keep the Prozac the same but let's add some Buspar. I ordered the thyroid tests and she should follow up with me in 3 weeks.  Alfred Ortiz MD on 10/1/2020 at 12:35 PM

## 2020-10-07 ENCOUNTER — TELEPHONE (OUTPATIENT)
Dept: FAMILY MEDICINE | Facility: OTHER | Age: 35
End: 2020-10-07

## 2020-10-07 DIAGNOSIS — F41.9 ANXIETY: ICD-10-CM

## 2020-10-07 DIAGNOSIS — F40.01 PANIC DISORDER WITH AGORAPHOBIA: ICD-10-CM

## 2020-10-07 RX ORDER — CLONAZEPAM 1 MG/1
TABLET ORAL
Qty: 30 TABLET | Refills: 3 | Status: SHIPPED | OUTPATIENT
Start: 2020-10-07 | End: 2020-12-03 | Stop reason: DRUGHIGH

## 2020-10-07 NOTE — TELEPHONE ENCOUNTER
Positive for COVID and wondering if she can increase her Klonopin to 1mg tab.  She thinks her anxiety has increased due to being COVID positive.  She was tested in [place].  She did take a 1mg tablet today.  Dyana Sky LPN..........10/7/2020  12:25 PM

## 2020-10-07 NOTE — TELEPHONE ENCOUNTER
After the patient's name and date of birth was verified, the patient was told the below information.  Sindi Stacy LPN..................10/7/2020   1:41 PM

## 2020-10-30 DIAGNOSIS — F40.01 PANIC DISORDER WITH AGORAPHOBIA: ICD-10-CM

## 2020-10-30 DIAGNOSIS — F41.9 ANXIETY: ICD-10-CM

## 2020-10-30 DIAGNOSIS — R00.2 PALPITATIONS: ICD-10-CM

## 2020-10-30 DIAGNOSIS — R63.4 UNEXPLAINED WEIGHT LOSS: ICD-10-CM

## 2020-10-30 LAB
T4 FREE SERPL-MCNC: 0.74 NG/DL (ref 0.6–1.6)
TSH SERPL DL<=0.05 MIU/L-ACNC: 1.31 IU/ML (ref 0.34–5.6)

## 2020-10-30 PROCEDURE — 36415 COLL VENOUS BLD VENIPUNCTURE: CPT | Mod: ZL | Performed by: FAMILY MEDICINE

## 2020-10-30 PROCEDURE — 84443 ASSAY THYROID STIM HORMONE: CPT | Mod: ZL | Performed by: FAMILY MEDICINE

## 2020-10-30 PROCEDURE — 84439 ASSAY OF FREE THYROXINE: CPT | Mod: ZL | Performed by: FAMILY MEDICINE

## 2020-11-03 ENCOUNTER — OFFICE VISIT (OUTPATIENT)
Dept: FAMILY MEDICINE | Facility: OTHER | Age: 35
End: 2020-11-03
Attending: FAMILY MEDICINE
Payer: COMMERCIAL

## 2020-11-03 ENCOUNTER — MYC MEDICAL ADVICE (OUTPATIENT)
Dept: FAMILY MEDICINE | Facility: OTHER | Age: 35
End: 2020-11-03

## 2020-11-03 VITALS
HEART RATE: 66 BPM | TEMPERATURE: 98.2 F | WEIGHT: 123.2 LBS | OXYGEN SATURATION: 100 % | BODY MASS INDEX: 19.89 KG/M2 | SYSTOLIC BLOOD PRESSURE: 100 MMHG | DIASTOLIC BLOOD PRESSURE: 70 MMHG | RESPIRATION RATE: 20 BRPM

## 2020-11-03 DIAGNOSIS — F41.9 ANXIETY: ICD-10-CM

## 2020-11-03 DIAGNOSIS — L74.510 AXILLARY HYPERHIDROSIS: ICD-10-CM

## 2020-11-03 DIAGNOSIS — F40.01 PANIC DISORDER WITH AGORAPHOBIA: Primary | ICD-10-CM

## 2020-11-03 DIAGNOSIS — M94.0 COSTOCHONDRITIS: ICD-10-CM

## 2020-11-03 DIAGNOSIS — F40.01 PANIC DISORDER WITH AGORAPHOBIA: ICD-10-CM

## 2020-11-03 DIAGNOSIS — Z23 NEEDS FLU SHOT: ICD-10-CM

## 2020-11-03 DIAGNOSIS — F33.1 MODERATE RECURRENT MAJOR DEPRESSION (H): ICD-10-CM

## 2020-11-03 DIAGNOSIS — R63.4 UNEXPLAINED WEIGHT LOSS: ICD-10-CM

## 2020-11-03 PROCEDURE — G0463 HOSPITAL OUTPT CLINIC VISIT: HCPCS

## 2020-11-03 PROCEDURE — G0008 ADMIN INFLUENZA VIRUS VAC: HCPCS

## 2020-11-03 PROCEDURE — G0463 HOSPITAL OUTPT CLINIC VISIT: HCPCS | Mod: 25

## 2020-11-03 PROCEDURE — 99214 OFFICE O/P EST MOD 30 MIN: CPT | Performed by: FAMILY MEDICINE

## 2020-11-03 RX ORDER — BUSPIRONE HYDROCHLORIDE 15 MG/1
15 TABLET ORAL 2 TIMES DAILY
Qty: 60 TABLET | Refills: 11 | COMMUNITY
Start: 2020-11-03 | End: 2020-12-03

## 2020-11-03 ASSESSMENT — ANXIETY QUESTIONNAIRES
1. FEELING NERVOUS, ANXIOUS, OR ON EDGE: NEARLY EVERY DAY
6. BECOMING EASILY ANNOYED OR IRRITABLE: SEVERAL DAYS
GAD7 TOTAL SCORE: 19
3. WORRYING TOO MUCH ABOUT DIFFERENT THINGS: NEARLY EVERY DAY
5. BEING SO RESTLESS THAT IT IS HARD TO SIT STILL: NEARLY EVERY DAY
7. FEELING AFRAID AS IF SOMETHING AWFUL MIGHT HAPPEN: NEARLY EVERY DAY
2. NOT BEING ABLE TO STOP OR CONTROL WORRYING: NEARLY EVERY DAY
IF YOU CHECKED OFF ANY PROBLEMS ON THIS QUESTIONNAIRE, HOW DIFFICULT HAVE THESE PROBLEMS MADE IT FOR YOU TO DO YOUR WORK, TAKE CARE OF THINGS AT HOME, OR GET ALONG WITH OTHER PEOPLE: EXTREMELY DIFFICULT

## 2020-11-03 ASSESSMENT — PATIENT HEALTH QUESTIONNAIRE - PHQ9
SUM OF ALL RESPONSES TO PHQ QUESTIONS 1-9: 19
5. POOR APPETITE OR OVEREATING: NEARLY EVERY DAY

## 2020-11-03 ASSESSMENT — PAIN SCALES - GENERAL: PAINLEVEL: NO PAIN (0)

## 2020-11-03 NOTE — PROGRESS NOTES
"Nursing Notes:   Edith Arguelles LPN  11/3/2020 10:07 AM  Signed  No chief complaint on file.      Initial /70   Pulse 66   Temp 98.2  F (36.8  C) (Oral)   Resp 20   Wt 55.9 kg (123 lb 3.2 oz)   SpO2 100%   Breastfeeding No   BMI 19.89 kg/m   Estimated body mass index is 19.89 kg/m  as calculated from the following:    Height as of 1/2/19: 1.676 m (5' 6\").    Weight as of this encounter: 55.9 kg (123 lb 3.2 oz).  Medication Reconciliation: complete    Edith Arguelles LPN          SUBJECTIVE:  Bev Shi  is a 34 year old female who comes in today because of unexpected weight loss.  She did have Covid earlier this month.  She was having increasing anxiety.  We increased her Klonopin a bit.  We did thyroid function tests to make sure she was not hyperthyroid and that was normal.  She continues on Prozac. She is on 20 mg.  She is not sleeping well. She has excessive sweating in her chest and armpits since the birth of her last daughter. Her legs move constantly while she is sleeping. Her legs feel restless a lot. She has been having nightmares.     She has quit smoking and she feels she is a bit less anxious from that. She feels she can't do much of anything because she can't focus.  She never took the Buspar.  She will sleep if she takes the Klonopin.     They have horses and cows. They do trail riding.     She has had some breast tenderness.  She is a big coffee drinker.     PHQ 1/2/2019 6/1/2020 11/3/2020   PHQ-9 Total Score 9 15 19   Q9: Thoughts of better off dead/self-harm past 2 weeks Not at all Not at all Not at all     LAI-7 SCORE 1/2/2019 6/1/2020 11/3/2020   Total Score 8 19 19           Past Medical, Family, and Social History reviewed and updated as noted below.   ROS is negative except as noted above       Allergies   Allergen Reactions     Hydrocodone Shortness Of Breath     Amoxicillin Rash     Codeine Nausea     Penicillins Rash     Piperacillin-Tazobactam In D5w Rash "   , History reviewed. No pertinent family history.,   Current Outpatient Medications   Medication     aluminum chloride (DRYSOL) 20 % external solution     clonazePAM (KLONOPIN) 1 MG tablet     FLUoxetine (PROZAC) 20 MG capsule     ondansetron (ZOFRAN-ODT) 4 MG ODT tab     valACYclovir (VALTREX) 1000 mg tablet     No current facility-administered medications for this visit.    ,   Past Medical History:   Diagnosis Date     Atrial septal defect     Venous-type     Breast lump     No Comments Provided     Pain in elbow     No Comments Provided     Personal history of other medical treatment (CODE)          Streptococcal pharyngitis     intermittent   ,   Patient Active Problem List    Diagnosis Date Noted     Axillary hyperhidrosis 2020     Priority: Medium     Moderate recurrent major depression (H) 2019     Priority: Medium     Panic disorder with agoraphobia 2016     Priority: Medium     Vitamin D insufficiency 2015     Priority: Medium     Anxiety 2015     Priority: Medium     Migraine without aura 2015     Priority: Medium     GERD (gastroesophageal reflux disease) 2014     Priority: Medium     H/O atrial septal defect repair 2014     Priority: Medium   ,   Past Surgical History:   Procedure Laterality Date     APPENDECTOMY OPEN      3/10/15,Appendectomy     LAPAROSCOPIC HYSTERECTOMY TOTAL, SALPINGECTOMY  2016    Left.  Dr. Martinez     LAPAROSCOPIC TUBAL LIGATION      3/10/15     OTHER SURGICAL HISTORY      ,255626,OTHER,repair of a sinus/venus type atrioseptal defect     OTHER SURGICAL HISTORY      ,188610,OTHER,Dr. Martinez, [facility]     OTHER SURGICAL HISTORY      3/10/2015,70628.0,[place] LAP TUBAL CAUTERY     OTHER SURGICAL HISTORY      3/14/2015,470392,OTHER    and   Social History     Tobacco Use     Smoking status: Light Tobacco Smoker     Packs/day: 0.10     Types: Cigarettes     Smokeless tobacco: Never Used   Substance Use Topics      Alcohol use: Yes     Alcohol/week: 0.0 standard drinks     Comment: Alcoholic Drinks/day: occ     OBJECTIVE:  /70   Pulse 66   Temp 98.2  F (36.8  C) (Oral)   Resp 20   Wt 55.9 kg (123 lb 3.2 oz)   SpO2 100%   Breastfeeding No   BMI 19.89 kg/m     EXAM:  Alert and cooperative, no distress.  Affect is broad ranging and appropriate.  She is somewhat anxious.  No formal thought disorder.  No homicidal or suicidal ideations.  She has a little tender area over her left anterior costochondral junction superiorly that is mildly uncomfortable with palpation.  ASSESSMENT/Plan :    Diagnoses and all orders for this visit:    Panic disorder with agoraphobia  -     MENTAL HEALTH REFERRAL  - Adult; Psychiatry; Psychiatry; Other: [facility] [phone]; We will contact you to schedule the appointment or please call with any questions  -     MENTAL HEALTH REFERRAL  - Adult; Psychiatry; Psychiatry; Other: [facility] [phone]; We will contact you to schedule the appointment or please call with any questions    Needs flu shot  -     [facility]-IMM- FLU VAC PRESRV FREE QUAD SPLIT VIR > 6 MONTHS IM    Anxiety  -     MENTAL HEALTH REFERRAL  - Adult; Psychiatry; Psychiatry; Other: [facility] [phone]; We will contact you to schedule the appointment or please call with any questions  -     MENTAL HEALTH REFERRAL  - Adult; Psychiatry; Psychiatry; Other: [facility] [phone]; We will contact you to schedule the appointment or please call with any questions    Moderate recurrent major depression (H)  -     MENTAL HEALTH REFERRAL  - Adult; Psychiatry; Psychiatry; Other: [facility] [phone]; We will contact you to schedule the appointment or please call with any questions  -     MENTAL HEALTH REFERRAL  - Adult; Psychiatry; Psychiatry; Other: [facility] [phone]; We will contact you to schedule the appointment or please call with any questions    Unexplained weight  loss    Axillary hyperhidrosis  -     aluminum chloride (DRYSOL) 20 % external solution; Apply topically At Bedtime    Costochondritis      For her costochondritis, will do symptomatic treatment.  She was reassured.  She will try and cut back on coffee for breast tenderness.    We will do Drysol for her axillary hyperhidrosis and discussed use of Aline as an antiperspirant.    Lengthy discussion with regard to her panic and agoraphobia symptoms.  She never did start the BuSpar so recommended that she do that.  Recommend that she use a half a milligram of Klonopin at bedtime on a scheduled basis for the next 3 to 4 weeks.  If she is not improving, would then consider potentially adding some Seroquel at bedtime and moving the Klonopin to another time of the day.  We discussed referral to psychiatry for consideration of medical genomic testing and consultation.  Referral sent to [facility].  She will follow-up with me in 3 to 4 weeks, sooner if needed.      A total of 25 minutes was spent with the patient, greater than 50% of the time was spent in counseling/discussion of the aforementioned concerns.       Alfred Ortiz MD

## 2020-11-03 NOTE — NURSING NOTE
"No chief complaint on file.      Initial /70   Pulse 66   Temp 98.2  F (36.8  C) (Oral)   Resp 20   Wt 55.9 kg (123 lb 3.2 oz)   SpO2 100%   Breastfeeding No   BMI 19.89 kg/m   Estimated body mass index is 19.89 kg/m  as calculated from the following:    Height as of 1/2/19: 1.676 m (5' 6\").    Weight as of this encounter: 55.9 kg (123 lb 3.2 oz).  Medication Reconciliation: complete    Edith Arguelles LPN      "

## 2020-11-04 ENCOUNTER — MYC MEDICAL ADVICE (OUTPATIENT)
Dept: FAMILY MEDICINE | Facility: OTHER | Age: 35
End: 2020-11-04

## 2020-11-04 ASSESSMENT — ANXIETY QUESTIONNAIRES: GAD7 TOTAL SCORE: 19

## 2020-12-03 ENCOUNTER — OFFICE VISIT (OUTPATIENT)
Dept: FAMILY MEDICINE | Facility: OTHER | Age: 35
End: 2020-12-03
Attending: FAMILY MEDICINE
Payer: COMMERCIAL

## 2020-12-03 VITALS
RESPIRATION RATE: 16 BRPM | DIASTOLIC BLOOD PRESSURE: 64 MMHG | TEMPERATURE: 97.4 F | HEART RATE: 66 BPM | WEIGHT: 121.4 LBS | SYSTOLIC BLOOD PRESSURE: 94 MMHG | OXYGEN SATURATION: 100 % | BODY MASS INDEX: 19.59 KG/M2

## 2020-12-03 DIAGNOSIS — F33.1 MODERATE RECURRENT MAJOR DEPRESSION (H): ICD-10-CM

## 2020-12-03 DIAGNOSIS — L74.510 AXILLARY HYPERHIDROSIS: ICD-10-CM

## 2020-12-03 DIAGNOSIS — F41.9 ANXIETY: ICD-10-CM

## 2020-12-03 DIAGNOSIS — F40.01 PANIC DISORDER WITH AGORAPHOBIA: Primary | ICD-10-CM

## 2020-12-03 PROCEDURE — 99213 OFFICE O/P EST LOW 20 MIN: CPT | Performed by: FAMILY MEDICINE

## 2020-12-03 PROCEDURE — G0463 HOSPITAL OUTPT CLINIC VISIT: HCPCS

## 2020-12-03 RX ORDER — BUSPIRONE HYDROCHLORIDE 15 MG/1
15 TABLET ORAL 3 TIMES DAILY
Qty: 90 TABLET | Refills: 11 | Status: SHIPPED | OUTPATIENT
Start: 2020-12-03 | End: 2021-07-13

## 2020-12-03 RX ORDER — CLONAZEPAM 0.5 MG/1
TABLET ORAL
COMMUNITY
Start: 2020-11-25 | End: 2020-12-24

## 2020-12-03 ASSESSMENT — ANXIETY QUESTIONNAIRES
6. BECOMING EASILY ANNOYED OR IRRITABLE: NOT AT ALL
7. FEELING AFRAID AS IF SOMETHING AWFUL MIGHT HAPPEN: SEVERAL DAYS
5. BEING SO RESTLESS THAT IT IS HARD TO SIT STILL: MORE THAN HALF THE DAYS
3. WORRYING TOO MUCH ABOUT DIFFERENT THINGS: SEVERAL DAYS
GAD7 TOTAL SCORE: 6
1. FEELING NERVOUS, ANXIOUS, OR ON EDGE: SEVERAL DAYS
IF YOU CHECKED OFF ANY PROBLEMS ON THIS QUESTIONNAIRE, HOW DIFFICULT HAVE THESE PROBLEMS MADE IT FOR YOU TO DO YOUR WORK, TAKE CARE OF THINGS AT HOME, OR GET ALONG WITH OTHER PEOPLE: SOMEWHAT DIFFICULT
2. NOT BEING ABLE TO STOP OR CONTROL WORRYING: SEVERAL DAYS

## 2020-12-03 ASSESSMENT — PAIN SCALES - GENERAL: PAINLEVEL: NO PAIN (0)

## 2020-12-03 ASSESSMENT — PATIENT HEALTH QUESTIONNAIRE - PHQ9: 5. POOR APPETITE OR OVEREATING: NOT AT ALL

## 2020-12-03 NOTE — NURSING NOTE
"Chief Complaint   Patient presents with     RECHECK     weight loss       Initial BP 94/64   Pulse 66   Temp 97.4  F (36.3  C) (Temporal)   Resp 16   Wt 55.1 kg (121 lb 6.4 oz)   SpO2 100%   Breastfeeding No   BMI 19.59 kg/m   Estimated body mass index is 19.59 kg/m  as calculated from the following:    Height as of 1/2/19: 1.676 m (5' 6\").    Weight as of this encounter: 55.1 kg (121 lb 6.4 oz).  Medication Reconciliation: complete    Sindi Stacy LPN  "

## 2020-12-03 NOTE — PROGRESS NOTES
"Nursing Notes:   Sindi Stacy LPN  12/3/2020 10:03 AM  Signed  Chief Complaint   Patient presents with     RECHECK     weight loss       Initial BP 94/64   Pulse 66   Temp 97.4  F (36.3  C) (Temporal)   Resp 16   Wt 55.1 kg (121 lb 6.4 oz)   SpO2 100%   Breastfeeding No   BMI 19.59 kg/m   Estimated body mass index is 19.59 kg/m  as calculated from the following:    Height as of 1/2/19: 1.676 m (5' 6\").    Weight as of this encounter: 55.1 kg (121 lb 6.4 oz).  Medication Reconciliation: complete    Sindi Stacy LPN      SUBJECTIVE:  Bev Shi  is a 34 year old female who comes in today for follow-up of weight loss.  I saw her about a month ago.  Her weight was down from the summer but not much different than it was about a year ago.  She was still in a reasonable range for her height.      She was having some hyperhidrosis and we discussed use of Drysol.  It also could be associated with her coffee intake and that could also have an effect on breast tenderness.      We recommended that she stay on fluoxetine 20 mg daily we recommended that she start BuSpar since she had not done so previously.  We discussed having her use 0.5 mg of Klonopin at bedtime on a scheduled basis for 3 to 4 weeks and then have a follow-up.  We sent a referral to psychiatry at [facility].    PHQ 1/2/2019 6/1/2020 11/3/2020   PHQ-9 Total Score 9 15 19   Q9: Thoughts of better off dead/self-harm past 2 weeks Not at all Not at all Not at all     LAI-7 SCORE 6/1/2020 11/3/2020 12/3/2020   Total Score 19 19 6     She feels much better since starting the Buspar.  She still is having more trouble sleeping. She is grinding her teeth and has a .        Past Medical, Family, and Social History reviewed and updated as noted below.   ROS is negative except as noted above       Allergies   Allergen Reactions     Hydrocodone Shortness Of Breath     Amoxicillin Rash     Codeine Nausea     Penicillins Rash     " Piperacillin-Tazobactam In D5w Rash   , History reviewed. No pertinent family history.,   Current Outpatient Medications   Medication     aluminum chloride (DRYSOL) 20 % external solution     busPIRone (BUSPAR) 15 MG tablet     clonazePAM (KLONOPIN) 0.5 MG tablet     FLUoxetine (PROZAC) 20 MG capsule     ondansetron (ZOFRAN-ODT) 4 MG ODT tab     valACYclovir (VALTREX) 1000 mg tablet     No current facility-administered medications for this visit.    ,   Past Medical History:   Diagnosis Date     Atrial septal defect     Venous-type     Breast lump     No Comments Provided     Pain in elbow     No Comments Provided     Personal history of other medical treatment (CODE)          Streptococcal pharyngitis     intermittent   ,   Patient Active Problem List    Diagnosis Date Noted     Axillary hyperhidrosis 2020     Priority: Medium     Moderate recurrent major depression (H) 2019     Priority: Medium     Panic disorder with agoraphobia 2016     Priority: Medium     Vitamin D insufficiency 2015     Priority: Medium     Anxiety 2015     Priority: Medium     Migraine without aura 2015     Priority: Medium     GERD (gastroesophageal reflux disease) 2014     Priority: Medium     H/O atrial septal defect repair 2014     Priority: Medium   ,   Past Surgical History:   Procedure Laterality Date     APPENDECTOMY OPEN      3/10/15,Appendectomy     LAPAROSCOPIC HYSTERECTOMY TOTAL, SALPINGECTOMY  2016    Left.  Dr. Martinez     LAPAROSCOPIC TUBAL LIGATION      3/10/15     OTHER SURGICAL HISTORY      ,603324,OTHER,repair of a sinus/venus type atrioseptal defect     OTHER SURGICAL HISTORY      ,228786,OTHER,Dr. Martinez, [facility]     OTHER SURGICAL HISTORY      3/10/2015,50252.0,[place] LAP TUBAL CAUTERY     OTHER SURGICAL HISTORY      3/14/2015,349232,OTHER    and   Social History     Tobacco Use     Smoking status: Light Tobacco Smoker     Packs/day: 0.10     Types:  Cigarettes     Smokeless tobacco: Never Used   Substance Use Topics     Alcohol use: Yes     Alcohol/week: 0.0 standard drinks     Comment: Alcoholic Drinks/day: occ     OBJECTIVE:  BP 94/64   Pulse 66   Temp 97.4  F (36.3  C) (Temporal)   Resp 16   Wt 55.1 kg (121 lb 6.4 oz)   SpO2 100%   Breastfeeding No   BMI 19.59 kg/m     EXAM:  Alert cooperative, no distress.  Affect is more relaxed and broad ranging today.  Seems less anxious.  Depression inventory was not repeated.  Anxiety inventory was much better.  ASSESSMENT/Plan :    Bev was seen today for recheck.    Diagnoses and all orders for this visit:    Panic disorder with agoraphobia  -     FLUoxetine (PROZAC) 20 MG capsule; Take 1 capsule (20 mg) by mouth daily  -     busPIRone (BUSPAR) 15 MG tablet; Take 1 tablet (15 mg) by mouth 3 times daily    Anxiety  -     busPIRone (BUSPAR) 15 MG tablet; Take 1 tablet (15 mg) by mouth 3 times daily    Moderate recurrent major depression (H)  -     FLUoxetine (PROZAC) 20 MG capsule; Take 1 capsule (20 mg) by mouth daily    Axillary hyperhidrosis      At this point we will continue with fluoxetine 20 mg daily.  Increase BuSpar to 15 mg 3 times daily.  She will keep in touch with her progress over the course of the next month or so.  Discussed the importance of having good sleep.  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  She could increase the Klonopin to 1 mg at bedtime if needed and we certainly could try something different as far as sleep such as mirtazapine or Seroquel.  We will see how things go over the next month and she will let us know.    A total of 15 minutes was spent with the patient, greater than 50% of the time was spent in counseling/discussion of the aforementioned concerns.     Alfred Ortiz MD

## 2020-12-04 ASSESSMENT — ANXIETY QUESTIONNAIRES: GAD7 TOTAL SCORE: 6

## 2020-12-24 DIAGNOSIS — F40.01 PANIC DISORDER WITH AGORAPHOBIA: Primary | ICD-10-CM

## 2020-12-24 RX ORDER — CLONAZEPAM 0.5 MG/1
TABLET ORAL
Qty: 60 TABLET | Refills: 5 | Status: SHIPPED | OUTPATIENT
Start: 2020-12-24 | End: 2021-07-27

## 2020-12-24 NOTE — TELEPHONE ENCOUNTER
Disp Refills Start End CRIS   clonazePAM (KLONOPIN) 0.5 MG tablet   11/25/2020  --   Sig: TK 1/2 TO 1 T PO BID PRN   Class: Historical       LOV: 12/3/2020  Future Office visit: no future appointment scheduled at this time.       Routing refill request to provider for review/approval because:  Medication is reported/historical    Unable to complete prescription refill per RN Medication Refill Policy.................... Rachael Case RN ....................  12/24/2020   9:53 AM

## 2020-12-27 ENCOUNTER — HEALTH MAINTENANCE LETTER (OUTPATIENT)
Age: 35
End: 2020-12-27

## 2021-03-22 ENCOUNTER — MYC MEDICAL ADVICE (OUTPATIENT)
Dept: FAMILY MEDICINE | Facility: OTHER | Age: 36
End: 2021-03-22

## 2021-04-01 ENCOUNTER — MYC MEDICAL ADVICE (OUTPATIENT)
Dept: FAMILY MEDICINE | Facility: OTHER | Age: 36
End: 2021-04-01

## 2021-04-01 ENCOUNTER — OFFICE VISIT (OUTPATIENT)
Dept: FAMILY MEDICINE | Facility: OTHER | Age: 36
End: 2021-04-01
Attending: FAMILY MEDICINE
Payer: COMMERCIAL

## 2021-04-01 VITALS
SYSTOLIC BLOOD PRESSURE: 106 MMHG | TEMPERATURE: 97.1 F | DIASTOLIC BLOOD PRESSURE: 66 MMHG | BODY MASS INDEX: 19.69 KG/M2 | HEART RATE: 79 BPM | WEIGHT: 122 LBS | RESPIRATION RATE: 16 BRPM | OXYGEN SATURATION: 100 %

## 2021-04-01 DIAGNOSIS — F33.1 MODERATE RECURRENT MAJOR DEPRESSION (H): Primary | ICD-10-CM

## 2021-04-01 DIAGNOSIS — F41.9 ANXIETY: ICD-10-CM

## 2021-04-01 DIAGNOSIS — G89.29 CHRONIC ABDOMINAL PAIN: ICD-10-CM

## 2021-04-01 DIAGNOSIS — F40.01 PANIC DISORDER WITH AGORAPHOBIA: ICD-10-CM

## 2021-04-01 DIAGNOSIS — R10.9 CHRONIC ABDOMINAL PAIN: ICD-10-CM

## 2021-04-01 PROCEDURE — G0463 HOSPITAL OUTPT CLINIC VISIT: HCPCS

## 2021-04-01 PROCEDURE — 99215 OFFICE O/P EST HI 40 MIN: CPT | Performed by: FAMILY MEDICINE

## 2021-04-01 RX ORDER — ZINC GLUCONATE 50 MG
50 TABLET ORAL DAILY
COMMUNITY

## 2021-04-01 RX ORDER — GABAPENTIN 300 MG/1
CAPSULE ORAL
COMMUNITY
Start: 2021-03-16 | End: 2021-07-13

## 2021-04-01 RX ORDER — DICYCLOMINE HYDROCHLORIDE 10 MG/1
CAPSULE ORAL
COMMUNITY
Start: 2021-03-05

## 2021-04-01 ASSESSMENT — ANXIETY QUESTIONNAIRES
GAD7 TOTAL SCORE: 10
IF YOU CHECKED OFF ANY PROBLEMS ON THIS QUESTIONNAIRE, HOW DIFFICULT HAVE THESE PROBLEMS MADE IT FOR YOU TO DO YOUR WORK, TAKE CARE OF THINGS AT HOME, OR GET ALONG WITH OTHER PEOPLE: SOMEWHAT DIFFICULT
2. NOT BEING ABLE TO STOP OR CONTROL WORRYING: SEVERAL DAYS
3. WORRYING TOO MUCH ABOUT DIFFERENT THINGS: SEVERAL DAYS
7. FEELING AFRAID AS IF SOMETHING AWFUL MIGHT HAPPEN: MORE THAN HALF THE DAYS
1. FEELING NERVOUS, ANXIOUS, OR ON EDGE: NEARLY EVERY DAY
5. BEING SO RESTLESS THAT IT IS HARD TO SIT STILL: NEARLY EVERY DAY
6. BECOMING EASILY ANNOYED OR IRRITABLE: NOT AT ALL

## 2021-04-01 ASSESSMENT — PATIENT HEALTH QUESTIONNAIRE - PHQ9
5. POOR APPETITE OR OVEREATING: NOT AT ALL
SUM OF ALL RESPONSES TO PHQ QUESTIONS 1-9: 7

## 2021-04-01 ASSESSMENT — PAIN SCALES - GENERAL: PAINLEVEL: NO PAIN (0)

## 2021-04-01 NOTE — NURSING NOTE
"Chief Complaint   Patient presents with     Recheck Medication       Initial /66   Pulse 79   Temp 97.1  F (36.2  C) (Temporal)   Resp 16   Wt 55.3 kg (122 lb)   SpO2 100%   Breastfeeding No   BMI 19.69 kg/m   Estimated body mass index is 19.69 kg/m  as calculated from the following:    Height as of 1/2/19: 1.676 m (5' 6\").    Weight as of this encounter: 55.3 kg (122 lb).  Medication Reconciliation: complete    Sindi Stacy LPN  "
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
## 2021-04-02 ASSESSMENT — ANXIETY QUESTIONNAIRES: GAD7 TOTAL SCORE: 10

## 2021-04-29 ENCOUNTER — MYC MEDICAL ADVICE (OUTPATIENT)
Dept: FAMILY MEDICINE | Facility: OTHER | Age: 36
End: 2021-04-29

## 2021-07-05 ENCOUNTER — MYC MEDICAL ADVICE (OUTPATIENT)
Dept: FAMILY MEDICINE | Facility: OTHER | Age: 36
End: 2021-07-05

## 2021-07-05 NOTE — LETTER
[facility]  [address]  [place] [address]  Phone: [phone]  Fax: [phone]    July 6, 2021        Bev Shi  [address] [address]          To whom it may concern:    RE: Bev Shi    Due to significant mental health issues that are currently occurring, it would be reasonable for her to be off work due to medical reasons, effective immediately.    Please contact me for questions or concerns.      Sincerely,        Alfred Ortiz MD

## 2021-07-13 ENCOUNTER — OFFICE VISIT (OUTPATIENT)
Dept: NEUROLOGY | Facility: OTHER | Age: 36
End: 2021-07-13
Attending: NURSE PRACTITIONER
Payer: COMMERCIAL

## 2021-07-13 VITALS
HEART RATE: 66 BPM | RESPIRATION RATE: 14 BRPM | WEIGHT: 133.8 LBS | BODY MASS INDEX: 21.6 KG/M2 | TEMPERATURE: 98.1 F | OXYGEN SATURATION: 99 % | DIASTOLIC BLOOD PRESSURE: 62 MMHG | SYSTOLIC BLOOD PRESSURE: 102 MMHG

## 2021-07-13 DIAGNOSIS — M79.642 PAIN OF LEFT HAND: Primary | ICD-10-CM

## 2021-07-13 DIAGNOSIS — G83.24: ICD-10-CM

## 2021-07-13 PROCEDURE — G0463 HOSPITAL OUTPT CLINIC VISIT: HCPCS | Mod: 25

## 2021-07-13 PROCEDURE — 95886 MUSC TEST DONE W/N TEST COMP: CPT | Mod: 26 | Performed by: PSYCHIATRY & NEUROLOGY

## 2021-07-13 PROCEDURE — 95886 MUSC TEST DONE W/N TEST COMP: CPT | Performed by: PSYCHIATRY & NEUROLOGY

## 2021-07-13 PROCEDURE — 95911 NRV CNDJ TEST 9-10 STUDIES: CPT | Performed by: PSYCHIATRY & NEUROLOGY

## 2021-07-13 PROCEDURE — 95911 NRV CNDJ TEST 9-10 STUDIES: CPT | Mod: 26 | Performed by: PSYCHIATRY & NEUROLOGY

## 2021-07-13 PROCEDURE — 99203 OFFICE O/P NEW LOW 30 MIN: CPT | Mod: 25 | Performed by: PSYCHIATRY & NEUROLOGY

## 2021-07-13 ASSESSMENT — PAIN SCALES - GENERAL: PAINLEVEL: NO PAIN (0)

## 2021-07-13 NOTE — LETTER
7/13/2021         RE: Bev Shi  [address]  [place] [address]        Dear Colleague,    Thank you for referring your patient, Bev Shi, to the [facility]. Please see a copy of my visit note below.    Visit Date: 07/13/2021    NEURODIAGNOSTICS CONSULTATION    REFERRING SOURCE:  RICK Ceja CNP     HISTORY OF PRESENT ILLNESS:  The patient is a 35-year-old who relates that she accidentally slammed her left hand in a car door about 3-1/2 months ago.  She says that since then, she has had severe pain, especially in the first digit of the hand, and also had weakness and a tendency to drop whatever she is holding her symptoms have improved minimally since the injury.  Apparently, x-ray of the hand and wrist were unremarkable.    PAST MEDICAL HISTORY:  Essentially unremarkable.  The patient has a history of depression and anxiety.    REVIEW OF SYSTEMS:  A 10-system review of systems is negative.    SOCIAL HISTORY:  The patient does not use tobacco.  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    PHYSICAL EXAMINATION:  The patient is a very healthy-appearing 35-year-old.  She shows diffuse give-way weakness for the distal musculature of the left upper extremity, but ultimately did not show this during EMG.  She showed normal symmetric strength to the forearm flexors and extensors.  Reflexes were 1+/4 bilaterally at the biceps, triceps and brachioradialis tendons.  Gait was normal.  There is no evidence of Bautista's syndrome.    NERVE CONDUCTION STUDIES:  Motor nerve conduction testing was performed for the left median and ulnar nerves.  Distal latencies, amplitudes, conduction velocities and H reflex latencies were well within normal limits.    Antidromic sensory nerve conduction studies were performed for the second digital, fifth digital and radial nerves.  Orthodromic mixed conduction studies were performed for  the median and ulnar nerves.  Latencies, amplitudes and conduction velocities were well within normal limits.    MONOPOLAR EMG NEEDLE EXAMINATION:  Monopolar needle exam was performed for the left abductor pollicis brevis, first dorsal interosseous, extensor pollicis brevis, brachioradialis, and flexor carpi radialis.  All of the tested muscles showed normal insertional activity.  Motor units were normal in size with normal recruitment and interference.    IMPRESSION:  The patient is neurologically intact with respect to the left upper extremity.  There is no evidence for injury to the median, ulnar or radial nerves or other significant branches.  Physical exam is noteworthy for give-way weakness, which was not present during EMG.  It would appear that there is a significant functional component to the presentation.      Hadley Velásquez MD        D: 2021   T: 2021   MT: PAKMT    Name:     NADIA KOHLER  MRN:      [id number]        Account:    [id number]   :      1985           Visit Date: 2021     Document: [id number]    cc:  Anila Iraheta NP       Again, thank you for allowing me to participate in the care of your patient.        Sincerely,        Hadley Velásquez MD

## 2021-07-13 NOTE — NURSING NOTE
Pt presents to clinic today for left hand numbness.      Medication Reconciliation: complete  Bev Mendoza LPN

## 2021-07-13 NOTE — PROGRESS NOTES
Visit Date: 07/13/2021    NEURODIAGNOSTICS CONSULTATION    REFERRING SOURCE:  RICK Ceja, CNP     HISTORY OF PRESENT ILLNESS:  The patient is a 35-year-old who relates that she accidentally slammed her left hand in a car door about 3-1/2 months ago.  She says that since then, she has had severe pain, especially in the first digit of the hand, and also had weakness and a tendency to drop whatever she is holding her symptoms have improved minimally since the injury.  Apparently, x-ray of the hand and wrist were unremarkable.    PAST MEDICAL HISTORY:  Essentially unremarkable.  The patient has a history of depression and anxiety.    REVIEW OF SYSTEMS:  A 10-system review of systems is negative.    SOCIAL HISTORY:  The patient does not use tobacco.  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    PHYSICAL EXAMINATION:  The patient is a very healthy-appearing 35-year-old.  She shows diffuse give-way weakness for the distal musculature of the left upper extremity, but ultimately did not show this during EMG.  She showed normal symmetric strength to the forearm flexors and extensors.  Reflexes were 1+/4 bilaterally at the biceps, triceps and brachioradialis tendons.  Gait was normal.  There is no evidence of Bautista's syndrome.    NERVE CONDUCTION STUDIES:  Motor nerve conduction testing was performed for the left median and ulnar nerves.  Distal latencies, amplitudes, conduction velocities and H reflex latencies were well within normal limits.    Antidromic sensory nerve conduction studies were performed for the second digital, fifth digital and radial nerves.  Orthodromic mixed conduction studies were performed for the median and ulnar nerves.  Latencies, amplitudes and conduction velocities were well within normal limits.    MONOPOLAR EMG NEEDLE EXAMINATION:  Monopolar needle exam was performed for the left abductor pollicis brevis, first dorsal interosseous,  extensor pollicis brevis, brachioradialis, and flexor carpi radialis.  All of the tested muscles showed normal insertional activity.  Motor units were normal in size with normal recruitment and interference.    IMPRESSION:  The patient is neurologically intact with respect to the left upper extremity.  There is no evidence for injury to the median, ulnar or radial nerves or other significant branches.  Physical exam is noteworthy for give-way weakness, which was not present during EMG.  It would appear that there is a significant functional component to the presentation.      Hadley Velásquez MD        D: 2021   T: 2021   MT: PAKMT    Name:     NADIA KOHLER  MRN:      [id number]        Account:    [id number]   :      1985           Visit Date: 2021     Document: [id number]    cc:  Anila Iraheta NP

## 2021-07-27 ENCOUNTER — OFFICE VISIT (OUTPATIENT)
Dept: FAMILY MEDICINE | Facility: OTHER | Age: 36
End: 2021-07-27
Attending: FAMILY MEDICINE
Payer: COMMERCIAL

## 2021-07-27 VITALS
BODY MASS INDEX: 20.99 KG/M2 | HEIGHT: 66 IN | WEIGHT: 130.6 LBS | HEART RATE: 64 BPM | DIASTOLIC BLOOD PRESSURE: 70 MMHG | RESPIRATION RATE: 16 BRPM | TEMPERATURE: 97.7 F | SYSTOLIC BLOOD PRESSURE: 110 MMHG | OXYGEN SATURATION: 100 %

## 2021-07-27 DIAGNOSIS — F40.01 PANIC DISORDER WITH AGORAPHOBIA: ICD-10-CM

## 2021-07-27 DIAGNOSIS — F33.1 MODERATE RECURRENT MAJOR DEPRESSION (H): Primary | ICD-10-CM

## 2021-07-27 DIAGNOSIS — F41.9 ANXIETY: ICD-10-CM

## 2021-07-27 PROCEDURE — G0463 HOSPITAL OUTPT CLINIC VISIT: HCPCS

## 2021-07-27 PROCEDURE — 99215 OFFICE O/P EST HI 40 MIN: CPT | Performed by: FAMILY MEDICINE

## 2021-07-27 RX ORDER — CLONAZEPAM 0.5 MG/1
TABLET ORAL
Qty: 60 TABLET | Refills: 5 | Status: SHIPPED | OUTPATIENT
Start: 2021-07-27 | End: 2022-02-01

## 2021-07-27 ASSESSMENT — ANXIETY QUESTIONNAIRES
7. FEELING AFRAID AS IF SOMETHING AWFUL MIGHT HAPPEN: NEARLY EVERY DAY
8. IF YOU CHECKED OFF ANY PROBLEMS, HOW DIFFICULT HAVE THESE MADE IT FOR YOU TO DO YOUR WORK, TAKE CARE OF THINGS AT HOME, OR GET ALONG WITH OTHER PEOPLE?: EXTREMELY DIFFICULT
GAD7 TOTAL SCORE: 18
6. BECOMING EASILY ANNOYED OR IRRITABLE: NEARLY EVERY DAY
2. NOT BEING ABLE TO STOP OR CONTROL WORRYING: NEARLY EVERY DAY
5. BEING SO RESTLESS THAT IT IS HARD TO SIT STILL: NOT AT ALL
7. FEELING AFRAID AS IF SOMETHING AWFUL MIGHT HAPPEN: NEARLY EVERY DAY
1. FEELING NERVOUS, ANXIOUS, OR ON EDGE: NEARLY EVERY DAY
GAD7 TOTAL SCORE: 18
GAD7 TOTAL SCORE: 18
4. TROUBLE RELAXING: NEARLY EVERY DAY
3. WORRYING TOO MUCH ABOUT DIFFERENT THINGS: NEARLY EVERY DAY

## 2021-07-27 ASSESSMENT — PAIN SCALES - GENERAL: PAINLEVEL: NO PAIN (0)

## 2021-07-27 ASSESSMENT — PATIENT HEALTH QUESTIONNAIRE - PHQ9
SUM OF ALL RESPONSES TO PHQ QUESTIONS 1-9: 16
SUM OF ALL RESPONSES TO PHQ QUESTIONS 1-9: 16
10. IF YOU CHECKED OFF ANY PROBLEMS, HOW DIFFICULT HAVE THESE PROBLEMS MADE IT FOR YOU TO DO YOUR WORK, TAKE CARE OF THINGS AT HOME, OR GET ALONG WITH OTHER PEOPLE: VERY DIFFICULT

## 2021-07-27 ASSESSMENT — MIFFLIN-ST. JEOR: SCORE: 1304.15

## 2021-07-27 NOTE — NURSING NOTE
"Chief Complaint   Patient presents with     Recheck Medication     Headache         Initial /70   Pulse 64   Temp 97.7  F (36.5  C) (Temporal)   Resp 16   Ht 1.676 m (5' 6\")   Wt 59.2 kg (130 lb 9.6 oz)   SpO2 100%   BMI 21.08 kg/m   Estimated body mass index is 21.08 kg/m  as calculated from the following:    Height as of this encounter: 1.676 m (5' 6\").    Weight as of this encounter: 59.2 kg (130 lb 9.6 oz).     FOOD SECURITY SCREENING QUESTIONS  Hunger Vital Signs:  Within the past 12 months we worried whether our food would run out before we got money to buy more. Never  Within the past 12 months the food we bought just didn't last and we didn't have money to get more. Never      Medication Reconciliation: Complete      Norma J. Gosselin, LPN   "

## 2021-07-27 NOTE — PROGRESS NOTES
"Nursing Notes:   Gosselin, Norma J., LPN  7/27/2021  3:11 PM  Signed  Chief Complaint   Patient presents with     Recheck Medication     Headache         Initial /70   Pulse 64   Temp 97.7  F (36.5  C) (Temporal)   Resp 16   Ht 1.676 m (5' 6\")   Wt 59.2 kg (130 lb 9.6 oz)   SpO2 100%   BMI 21.08 kg/m   Estimated body mass index is 21.08 kg/m  as calculated from the following:    Height as of this encounter: 1.676 m (5' 6\").    Weight as of this encounter: 59.2 kg (130 lb 9.6 oz).     FOOD SECURITY SCREENING QUESTIONS  Hunger Vital Signs:  Within the past 12 months we worried whether our food would run out before we got money to buy more. Never  Within the past 12 months the food we bought just didn't last and we didn't have money to get more. Never      Medication Reconciliation: Complete      Norma J. Gosselin, LPN       SUBJECTIVE:  Bev Shi  is a 35 year old female who comes in today with several concerns.  I last saw her in April.  She was having significant anxiety symptoms at that time and was on BuSpar and clonazepam.  Her son was having some issues being bullied at school and she had been having night terrors.  We had referred her to [facility] for psychiatry but she did not call. She was having some GI issues as well. In April we referred her for psychiatric consultation.  We felt that she probably had some PTSD and also benefit from counseling.  We did not resume any other medications even though she had been on fluoxetine in the past.  She plans to take a few weeks off of work as a medical leave, but never did it.    In late April, she was having some symptoms that she described as \"brain zaps\" that she felt were due to the buspirone so she stopped that.  She was having some breast tenderness. In early July, she did not feel she could handle the stress at work because of significant anxiety and she put in her notice and her last day of work was to be 2 days ago, but she is " still working.  We reminded her that we had referred her to [facility] and encouraged her to set up an appointment for consultation with them.  She never followed through with this.    PHQ 11/3/2020 2021 2021   PHQ-9 Total Score 19 7 16   Q9: Thoughts of better off dead/self-harm past 2 weeks Not at all Not at all Not at all     LAI-7 SCORE 12/3/2020 2021 2021   Total Score - - 18 (severe anxiety)   Total Score 6 10 18     She has been having trouble with headaches.  They seem to be stress related.      Past Medical, Family, and Social History reviewed and updated as noted below.   ROS is negative except as noted above       Allergies   Allergen Reactions     Hydrocodone Shortness Of Breath     Amoxicillin Rash     Codeine Nausea     Diagnostic X-Ray Materials Rash     Pt developed red rash on chest and hands from [facility] for MRI scan.  Recommend premed prior scans (benadryl)      Penicillins Rash     Piperacillin-Tazobactam In D5w Rash   , History reviewed. No pertinent family history.,   Current Outpatient Medications   Medication     aluminum chloride (DRYSOL) 20 % external solution     clonazePAM (KLONOPIN) 0.5 MG tablet     dicyclomine (BENTYL) 10 MG capsule     FLUoxetine (PROZAC) 20 MG capsule     omeprazole (PRILOSEC) 20 MG DR capsule     ondansetron (ZOFRAN-ODT) 4 MG ODT tab     valACYclovir (VALTREX) 1000 mg tablet     zinc gluconate 50 MG tablet     No current facility-administered medications for this visit.   ,   Past Medical History:   Diagnosis Date     Atrial septal defect     Venous-type     Breast lump     No Comments Provided     Pain in elbow     No Comments Provided     Personal history of other medical treatment (CODE)          Streptococcal pharyngitis     intermittent   ,   Patient Active Problem List    Diagnosis Date Noted     Axillary hyperhidrosis 2020     Priority: Medium     Moderate recurrent major depression (H) 2019     Priority: Medium     Panic  "disorder with agoraphobia 01/25/2016     Priority: Medium     Vitamin D insufficiency 12/28/2015     Priority: Medium     Anxiety 12/07/2015     Priority: Medium     Migraine without aura 07/09/2015     Priority: Medium     GERD (gastroesophageal reflux disease) 04/30/2014     Priority: Medium     H/O atrial septal defect repair 04/04/2014     Priority: Medium   ,   Past Surgical History:   Procedure Laterality Date     APPENDECTOMY OPEN      3/10/15,Appendectomy     LAPAROSCOPIC HYSTERECTOMY TOTAL, SALPINGECTOMY  03/29/2016    Left.  Dr. Martinez     LAPAROSCOPIC TUBAL LIGATION      3/10/15     OTHER SURGICAL HISTORY      11/98,747570,OTHER,repair of a sinus/venus type atrioseptal defect     OTHER SURGICAL HISTORY      2013,[id number],OTHER,Dr. Martinez, [facility]     OTHER SURGICAL HISTORY      3/10/2015,58683.0,CT LAP TUBAL CAUTERY     OTHER SURGICAL HISTORY      3/14/2015,777304,OTHER    and   Social History     Tobacco Use     Smoking status: Light Tobacco Smoker     Packs/day: 0.10     Types: Cigarettes     Smokeless tobacco: Never Used     Tobacco comment: by herself   Substance Use Topics     Alcohol use: Yes     Alcohol/week: 0.0 standard drinks     Comment: Alcoholic Drinks/day: occ     OBJECTIVE:  /70   Pulse 64   Temp 97.7  F (36.5  C) (Temporal)   Resp 16   Ht 1.676 m (5' 6\")   Wt 59.2 kg (130 lb 9.6 oz)   SpO2 100%   BMI 21.08 kg/m     EXAM:  Alert cooperative, no distress.  She has no formal thought disorder.  No homicidal suicidal ideations.  ASSESSMENT/Plan :    Bev was seen today for recheck medication and headache.    Diagnoses and all orders for this visit:    Moderate recurrent major depression (H)  -     FLUoxetine (PROZAC) 20 MG capsule; Take 1 capsule (20 mg) by mouth daily    Panic disorder with agoraphobia  -     FLUoxetine (PROZAC) 20 MG capsule; Take 1 capsule (20 mg) by mouth daily  -     clonazePAM (KLONOPIN) 0.5 MG tablet; TAKE 1/2 TO 1 TABLET BY MOUTH TWICE DAILY AS " NEEDED    Anxiety  -     FLUoxetine (PROZAC) 20 MG capsule; Take 1 capsule (20 mg) by mouth daily      Lengthy discussion with the patient.  Her son accompanies her to the appointment.  I really think she needs to make a change with work.  She has put in her notice now and plans to stop working there in a month.  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  We discussed seeing psychology and psychiatry and I really think she would benefit from this but I am not sure that she will follow through with that.  She does use Klonopin on a as needed basis and we renewed that as she has had no evidence of inappropriate use.  She wondered about going back on fluoxetine at 20 mg and I told her I would do that but I really felt that the crux of her issue was more her work and needing a break from that.    I recommended that she follow-up with me in 1 month, sooner if needed.    A total of 42 minutes was spent with the patient, reviewing records, tests, ordering medications, tests or procedures and documenting clinical information in the EHR.     Alfred Ortiz MD            Answers for HPI/ROS submitted by the patient on 7/27/2021  If you checked off any problems, how difficult have these problems made it for you to do your work, take care of things at home, or get along with other people?: Very difficult  PHQ9 TOTAL SCORE: 16  LAI 7 TOTAL SCORE: 18

## 2021-07-28 ASSESSMENT — PATIENT HEALTH QUESTIONNAIRE - PHQ9: SUM OF ALL RESPONSES TO PHQ QUESTIONS 1-9: 16

## 2021-07-28 ASSESSMENT — ANXIETY QUESTIONNAIRES: GAD7 TOTAL SCORE: 18

## 2021-08-12 ENCOUNTER — MYC MEDICAL ADVICE (OUTPATIENT)
Dept: FAMILY MEDICINE | Facility: OTHER | Age: 36
End: 2021-08-12

## 2021-08-16 ENCOUNTER — MYC MEDICAL ADVICE (OUTPATIENT)
Dept: FAMILY MEDICINE | Facility: OTHER | Age: 36
End: 2021-08-16

## 2021-08-16 NOTE — TELEPHONE ENCOUNTER
The patient was notified that the note is ready to be picked up.  Sindi Stacy LPN..................8/16/2021   9:18 AM

## 2021-09-04 ASSESSMENT — ANXIETY QUESTIONNAIRES
8. IF YOU CHECKED OFF ANY PROBLEMS, HOW DIFFICULT HAVE THESE MADE IT FOR YOU TO DO YOUR WORK, TAKE CARE OF THINGS AT HOME, OR GET ALONG WITH OTHER PEOPLE?: SOMEWHAT DIFFICULT
GAD7 TOTAL SCORE: 11
GAD7 TOTAL SCORE: 11
4. TROUBLE RELAXING: SEVERAL DAYS
2. NOT BEING ABLE TO STOP OR CONTROL WORRYING: MORE THAN HALF THE DAYS
5. BEING SO RESTLESS THAT IT IS HARD TO SIT STILL: SEVERAL DAYS
1. FEELING NERVOUS, ANXIOUS, OR ON EDGE: MORE THAN HALF THE DAYS
7. FEELING AFRAID AS IF SOMETHING AWFUL MIGHT HAPPEN: MORE THAN HALF THE DAYS
6. BECOMING EASILY ANNOYED OR IRRITABLE: SEVERAL DAYS
3. WORRYING TOO MUCH ABOUT DIFFERENT THINGS: MORE THAN HALF THE DAYS
GAD7 TOTAL SCORE: 11
7. FEELING AFRAID AS IF SOMETHING AWFUL MIGHT HAPPEN: MORE THAN HALF THE DAYS

## 2021-09-04 ASSESSMENT — PATIENT HEALTH QUESTIONNAIRE - PHQ9
SUM OF ALL RESPONSES TO PHQ QUESTIONS 1-9: 3
SUM OF ALL RESPONSES TO PHQ QUESTIONS 1-9: 3
10. IF YOU CHECKED OFF ANY PROBLEMS, HOW DIFFICULT HAVE THESE PROBLEMS MADE IT FOR YOU TO DO YOUR WORK, TAKE CARE OF THINGS AT HOME, OR GET ALONG WITH OTHER PEOPLE: SOMEWHAT DIFFICULT

## 2021-09-05 ASSESSMENT — PATIENT HEALTH QUESTIONNAIRE - PHQ9: SUM OF ALL RESPONSES TO PHQ QUESTIONS 1-9: 3

## 2021-09-05 ASSESSMENT — ANXIETY QUESTIONNAIRES: GAD7 TOTAL SCORE: 11

## 2021-09-07 ENCOUNTER — OFFICE VISIT (OUTPATIENT)
Dept: FAMILY MEDICINE | Facility: OTHER | Age: 36
End: 2021-09-07
Attending: FAMILY MEDICINE
Payer: COMMERCIAL

## 2021-09-07 VITALS
SYSTOLIC BLOOD PRESSURE: 112 MMHG | OXYGEN SATURATION: 99 % | WEIGHT: 132.4 LBS | BODY MASS INDEX: 21.37 KG/M2 | RESPIRATION RATE: 18 BRPM | HEART RATE: 68 BPM | DIASTOLIC BLOOD PRESSURE: 64 MMHG | TEMPERATURE: 98.2 F

## 2021-09-07 DIAGNOSIS — F40.00 AGORAPHOBIA: ICD-10-CM

## 2021-09-07 DIAGNOSIS — F33.1 MODERATE RECURRENT MAJOR DEPRESSION (H): ICD-10-CM

## 2021-09-07 DIAGNOSIS — F41.9 ANXIETY: Primary | ICD-10-CM

## 2021-09-07 PROCEDURE — 99213 OFFICE O/P EST LOW 20 MIN: CPT | Performed by: FAMILY MEDICINE

## 2021-09-07 PROCEDURE — G0463 HOSPITAL OUTPT CLINIC VISIT: HCPCS

## 2021-09-07 ASSESSMENT — PAIN SCALES - GENERAL: PAINLEVEL: NO PAIN (0)

## 2021-09-07 NOTE — NURSING NOTE
"Patient here for followup.   Vani Pineda LPN ..........9/7/2021 9:02 AM   Chief Complaint   Patient presents with     RECHECK       Initial /64 (BP Location: Right arm, Patient Position: Sitting, Cuff Size: Adult Regular)   Pulse 68   Temp 98.2  F (36.8  C) (Tympanic)   Resp 18   Wt 60.1 kg (132 lb 6.4 oz)   LMP  (LMP Unknown)   SpO2 99%   BMI 21.37 kg/m   Estimated body mass index is 21.37 kg/m  as calculated from the following:    Height as of 7/27/21: 1.676 m (5' 6\").    Weight as of this encounter: 60.1 kg (132 lb 6.4 oz).  Medication Reconciliation: complete    Vani Pineda LPN    Advance Care Directive reviewed    "

## 2021-09-07 NOTE — PROGRESS NOTES
"Nursing Notes:   Vani Pineda LPN  9/7/2021  9:13 AM  Signed  Patient here for followup.   Vani Pineda LPN ..........9/7/2021 9:02 AM   Chief Complaint   Patient presents with     RECHECK       Initial /64 (BP Location: Right arm, Patient Position: Sitting, Cuff Size: Adult Regular)   Pulse 68   Temp 98.2  F (36.8  C) (Tympanic)   Resp 18   Wt 60.1 kg (132 lb 6.4 oz)   LMP  (LMP Unknown)   SpO2 99%   BMI 21.37 kg/m   Estimated body mass index is 21.37 kg/m  as calculated from the following:    Height as of 7/27/21: 1.676 m (5' 6\").    Weight as of this encounter: 60.1 kg (132 lb 6.4 oz).  Medication Reconciliation: complete    Vani Pineda LPN    Advance Care Directive reviewed        SUBJECTIVE:  Bev Shi  is a 35 year old female who comes in today for follow-up.  I last saw her about a month ago.  In previous appointments, we had referred her for psychiatric consultation but she did not follow through with that.  She also was planning to take some time off work but did not do that either.  At her last visit, we discussed making a change at work.  We discussed the importance of following through with psychology and psychiatry.  She was continuing to use as needed Klonopin.  She wanted to go back on fluoxetine in the meantime. She has been taking it for 2 weeks.     PHQ 4/1/2021 7/27/2021 9/4/2021   PHQ-9 Total Score 7 16 3   Q9: Thoughts of better off dead/self-harm past 2 weeks Not at all Not at all Not at all     LAI-7 SCORE 4/1/2021 7/27/2021 9/4/2021   Total Score - 18 (severe anxiety) 11 (moderate anxiety)   Total Score 10 18 11       She is not working and is happier. She is still having a lot of anxiety. She has some agoraphobia. She doesn't like going into the public.    Her nephew was in a car accident with his parent who have addiction issues. Matt lives at [facility] [place] a lot of the time.     Past Medical, Family, and Social History reviewed and updated as noted " below.   ROS is negative except as noted above       Allergies   Allergen Reactions     Hydrocodone Shortness Of Breath     Amoxicillin Rash     Codeine Nausea     Diagnostic X-Ray Materials Rash     Pt developed red rash on chest and hands from [facility] for MRI scan.  Recommend premed prior scans (benadryl)      Penicillins Rash     Piperacillin-Tazobactam In D5w Rash   , History reviewed. No pertinent family history.,   Current Outpatient Medications   Medication     aluminum chloride (DRYSOL) 20 % external solution     clonazePAM (KLONOPIN) 0.5 MG tablet     dicyclomine (BENTYL) 10 MG capsule     FLUoxetine (PROZAC) 20 MG capsule     omeprazole (PRILOSEC) 20 MG DR capsule     ondansetron (ZOFRAN-ODT) 4 MG ODT tab     valACYclovir (VALTREX) 1000 mg tablet     zinc gluconate 50 MG tablet     No current facility-administered medications for this visit.   ,   Past Medical History:   Diagnosis Date     Atrial septal defect     Venous-type     Breast lump     No Comments Provided     Pain in elbow     No Comments Provided     Personal history of other medical treatment (CODE)          Streptococcal pharyngitis     intermittent   ,   Patient Active Problem List    Diagnosis Date Noted     Axillary hyperhidrosis 2020     Priority: Medium     Moderate recurrent major depression (H) 2019     Priority: Medium     Panic disorder with agoraphobia 2016     Priority: Medium     Vitamin D insufficiency 2015     Priority: Medium     Anxiety 2015     Priority: Medium     Migraine without aura 2015     Priority: Medium     GERD (gastroesophageal reflux disease) 2014     Priority: Medium     H/O atrial septal defect repair 2014     Priority: Medium   ,   Past Surgical History:   Procedure Laterality Date     APPENDECTOMY OPEN      3/10/15,Appendectomy     LAPAROSCOPIC HYSTERECTOMY TOTAL, SALPINGECTOMY  2016    Left.  Dr. Martinez     LAPAROSCOPIC TUBAL LIGATION      3/10/15      OTHER SURGICAL HISTORY      11/98,600000,OTHER,repair of a sinus/venus type atrioseptal defect     OTHER SURGICAL HISTORY      2013,600000,OTHER,Dr. Martinez, [facility]     OTHER SURGICAL HISTORY      3/10/2015,83805.0,[facility] LAP TUBAL CAUTERY     OTHER SURGICAL HISTORY      3/14/2015,524254,OTHER    and   Social History     Tobacco Use     Smoking status: Light Tobacco Smoker     Packs/day: 0.10     Types: Cigarettes     Smokeless tobacco: Never Used     Tobacco comment: by herself   Substance Use Topics     Alcohol use: Yes     Alcohol/week: 0.0 standard drinks     Comment: Alcoholic Drinks/day: occ     OBJECTIVE:  /64 (BP Location: Right arm, Patient Position: Sitting, Cuff Size: Adult Regular)   Pulse 68   Temp 98.2  F (36.8  C) (Tympanic)   Resp 18   Wt 60.1 kg (132 lb 6.4 oz)   LMP  (LMP Unknown)   SpO2 99%   BMI 21.37 kg/m     EXAM:  Alert cooperative, no distress.  Affect is appropriate but she has anxious.  ASSESSMENT/Plan :    Bev was seen today for recheck.    Diagnoses and all orders for this visit:    Anxiety    Moderate recurrent major depression (H)    Agoraphobia      Lengthy discussion with regard to her multiple psychosocial stressors.  Recommend that she pursue some counseling and she will make that appointment.  She is only been on the fluoxetine for 2 weeks so we did not adjust her dose but recommended that she follow-up in 3 to 4 weeks to see if an adjustment might be reasonable.  Discussed appropriate use of clonazepam and she seems to be doing this appropriately.  She will keep in touch with her progress and follow-up sooner if she is having more difficulties.    A total of 23 minutes was spent with the patient, reviewing records, tests, ordering medications, tests or procedures and documenting clinical information in the EHR.     Alfred Ortiz MD

## 2021-09-28 ENCOUNTER — OFFICE VISIT (OUTPATIENT)
Dept: FAMILY MEDICINE | Facility: OTHER | Age: 36
End: 2021-09-28
Attending: FAMILY MEDICINE
Payer: COMMERCIAL

## 2021-09-28 VITALS
WEIGHT: 129 LBS | DIASTOLIC BLOOD PRESSURE: 80 MMHG | TEMPERATURE: 97.1 F | OXYGEN SATURATION: 100 % | SYSTOLIC BLOOD PRESSURE: 124 MMHG | RESPIRATION RATE: 16 BRPM | BODY MASS INDEX: 20.82 KG/M2 | HEART RATE: 60 BPM

## 2021-09-28 DIAGNOSIS — F40.01 PANIC DISORDER WITH AGORAPHOBIA: Primary | ICD-10-CM

## 2021-09-28 DIAGNOSIS — R11.0 NAUSEA: ICD-10-CM

## 2021-09-28 DIAGNOSIS — Z23 NEEDS FLU SHOT: ICD-10-CM

## 2021-09-28 DIAGNOSIS — F41.9 ANXIETY: ICD-10-CM

## 2021-09-28 PROCEDURE — 90471 IMMUNIZATION ADMIN: CPT

## 2021-09-28 PROCEDURE — G0463 HOSPITAL OUTPT CLINIC VISIT: HCPCS | Mod: 25

## 2021-09-28 PROCEDURE — G0463 HOSPITAL OUTPT CLINIC VISIT: HCPCS

## 2021-09-28 PROCEDURE — 99213 OFFICE O/P EST LOW 20 MIN: CPT | Performed by: FAMILY MEDICINE

## 2021-09-28 RX ORDER — ONDANSETRON 4 MG/1
4 TABLET, ORALLY DISINTEGRATING ORAL EVERY 8 HOURS PRN
Qty: 20 TABLET | Refills: 1 | Status: SHIPPED | OUTPATIENT
Start: 2021-09-28 | End: 2023-02-13

## 2021-09-28 RX ORDER — QUETIAPINE FUMARATE 25 MG/1
25 TABLET, FILM COATED ORAL AT BEDTIME
Qty: 30 TABLET | Refills: 4 | Status: SHIPPED | OUTPATIENT
Start: 2021-09-28 | End: 2022-09-07

## 2021-09-28 ASSESSMENT — ANXIETY QUESTIONNAIRES
8. IF YOU CHECKED OFF ANY PROBLEMS, HOW DIFFICULT HAVE THESE MADE IT FOR YOU TO DO YOUR WORK, TAKE CARE OF THINGS AT HOME, OR GET ALONG WITH OTHER PEOPLE?: EXTREMELY DIFFICULT
4. TROUBLE RELAXING: NEARLY EVERY DAY
GAD7 TOTAL SCORE: 17
5. BEING SO RESTLESS THAT IT IS HARD TO SIT STILL: NOT AT ALL
6. BECOMING EASILY ANNOYED OR IRRITABLE: MORE THAN HALF THE DAYS
3. WORRYING TOO MUCH ABOUT DIFFERENT THINGS: NEARLY EVERY DAY
GAD7 TOTAL SCORE: 17
7. FEELING AFRAID AS IF SOMETHING AWFUL MIGHT HAPPEN: NEARLY EVERY DAY
GAD7 TOTAL SCORE: 17
2. NOT BEING ABLE TO STOP OR CONTROL WORRYING: NEARLY EVERY DAY
1. FEELING NERVOUS, ANXIOUS, OR ON EDGE: NEARLY EVERY DAY
7. FEELING AFRAID AS IF SOMETHING AWFUL MIGHT HAPPEN: NEARLY EVERY DAY

## 2021-09-28 ASSESSMENT — PATIENT HEALTH QUESTIONNAIRE - PHQ9
10. IF YOU CHECKED OFF ANY PROBLEMS, HOW DIFFICULT HAVE THESE PROBLEMS MADE IT FOR YOU TO DO YOUR WORK, TAKE CARE OF THINGS AT HOME, OR GET ALONG WITH OTHER PEOPLE: EXTREMELY DIFFICULT
SUM OF ALL RESPONSES TO PHQ QUESTIONS 1-9: 13
SUM OF ALL RESPONSES TO PHQ QUESTIONS 1-9: 13

## 2021-09-28 ASSESSMENT — PAIN SCALES - GENERAL: PAINLEVEL: NO PAIN (0)

## 2021-09-28 NOTE — NURSING NOTE
"Chief Complaint   Patient presents with     RECHECK     anxiety       Initial /80   Pulse 60   Temp 97.1  F (36.2  C) (Temporal)   Resp 16   Wt 58.5 kg (129 lb)   LMP  (LMP Unknown)   SpO2 100%   Breastfeeding No   BMI 20.82 kg/m   Estimated body mass index is 20.82 kg/m  as calculated from the following:    Height as of 7/27/21: 1.676 m (5' 6\").    Weight as of this encounter: 58.5 kg (129 lb).  Medication Reconciliation: complete    FOOD SECURITY SCREENING QUESTIONS  Hunger Vital Signs:  Within the past 12 months we worried whether our food would run out before we got money to buy more. Never  Within the past 12 months the food we bought just didn't last and we didn't have money to get more. Never    Sindi Stacy, ABNER  "

## 2021-09-28 NOTE — PROGRESS NOTES
"Nursing Notes:   Sindi Stacy LPN  9/28/2021  9:43 AM  Signed  Chief Complaint   Patient presents with     RECHECK     anxiety       Initial /80   Pulse 60   Temp 97.1  F (36.2  C) (Temporal)   Resp 16   Wt 58.5 kg (129 lb)   LMP  (LMP Unknown)   SpO2 100%   Breastfeeding No   BMI 20.82 kg/m   Estimated body mass index is 20.82 kg/m  as calculated from the following:    Height as of 7/27/21: 1.676 m (5' 6\").    Weight as of this encounter: 58.5 kg (129 lb).  Medication Reconciliation: complete    FOOD SECURITY SCREENING QUESTIONS  Hunger Vital Signs:  Within the past 12 months we worried whether our food would run out before we got money to buy more. Never  Within the past 12 months the food we bought just didn't last and we didn't have money to get more. Never    Sindi Stacy LPN      SUBJECTIVE:  Bev Shi  is a 35 year old female who comes in for follow up.  I last saw her 3 weeks ago.  She has now been on fluoxetine for about a month.When I last saw her, she was not working and seemed happier but was still having a lot of anxiety.  She was having some agoraphobic symptoms and avoiding going into public.  She was having some significant psychosocial stressors with her family.  She continues to use as needed clonazepam, but is taking it 2-3 times per day.     PHQ 7/27/2021 9/4/2021 9/28/2021   PHQ-9 Total Score 16 3 13   Q9: Thoughts of better off dead/self-harm past 2 weeks Not at all Not at all Not at all     LAI-7 SCORE 7/27/2021 9/4/2021 9/28/2021   Total Score 18 (severe anxiety) 11 (moderate anxiety) 17 (severe anxiety)   Total Score 18 11 17       She has started seeing Autumn for counseling and sees her weekly.  She had her first visit and she is feeling more stressed but she is glad to be doing it. She has continued to have a lot of stress. She is afraid of being alone. Her  has RC races next weekend and she is not going because it is homecoming week. 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     She had to bring her grandma to the doctor today.     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  She has taken an 18-year-old boy who has some family conflict so that he can graduate from high school.  She mentions this as an aside in addition all the rest of the psychosocial stressors she is having.    Past Medical, Family, and Social History reviewed and updated as noted below.   ROS is negative except as noted above       Allergies   Allergen Reactions     Hydrocodone Shortness Of Breath     Amoxicillin Rash     Codeine Nausea     Diagnostic X-Ray Materials Rash     Pt developed red rash on chest and hands from [facility] for MRI scan.  Recommend premed prior scans (benadryl)      Penicillins Rash     Piperacillin-Tazobactam In D5w Rash   , History reviewed. No pertinent family history.,   Current Outpatient Medications   Medication     aluminum chloride (DRYSOL) 20 % external solution     clonazePAM (KLONOPIN) 0.5 MG tablet     dicyclomine (BENTYL) 10 MG capsule     FLUoxetine (PROZAC) 20 MG capsule     ondansetron (ZOFRAN-ODT) 4 MG ODT tab     QUEtiapine (SEROQUEL) 25 MG tablet     valACYclovir (VALTREX) 1000 mg tablet     zinc gluconate 50 MG tablet     omeprazole (PRILOSEC) 20 MG DR capsule     No current facility-administered medications for this visit.   ,   Past Medical History:   Diagnosis Date     Atrial septal defect     Venous-type     Breast lump     No Comments Provided     Pain in elbow     No Comments Provided     Personal history of other medical treatment (CODE)          Streptococcal pharyngitis     intermittent   ,   Patient Active Problem List    Diagnosis Date Noted     Axillary hyperhidrosis 2020     Priority: Medium     Moderate recurrent major depression (H) 2019     Priority: Medium     Panic disorder with agoraphobia 2016     Priority: Medium     Vitamin D insufficiency 2015      Priority: Medium     Anxiety 12/07/2015     Priority: Medium     Migraine without aura 07/09/2015     Priority: Medium     GERD (gastroesophageal reflux disease) 04/30/2014     Priority: Medium     H/O atrial septal defect repair 04/04/2014     Priority: Medium   ,   Past Surgical History:   Procedure Laterality Date     APPENDECTOMY OPEN      3/10/15,Appendectomy     LAPAROSCOPIC HYSTERECTOMY TOTAL, SALPINGECTOMY  03/29/2016    Left.  Dr. Martinez     LAPAROSCOPIC TUBAL LIGATION      3/10/15     OTHER SURGICAL HISTORY      11/98,827365,OTHER,repair of a sinus/venus type atrioseptal defect     OTHER SURGICAL HISTORY      2013,247535,OTHER,Dr. Martinez, [facility]     OTHER SURGICAL HISTORY      3/10/2015,53320.0,[facility] LAP TUBAL CAUTERY     OTHER SURGICAL HISTORY      3/14/2015,937319,OTHER    and   Social History     Tobacco Use     Smoking status: Light Tobacco Smoker     Packs/day: 0.10     Types: Cigarettes     Smokeless tobacco: Never Used     Tobacco comment: working on it   Substance Use Topics     Alcohol use: Yes     Alcohol/week: 0.0 standard drinks     Comment: Alcoholic Drinks/day: occ     OBJECTIVE:  /80   Pulse 60   Temp 97.1  F (36.2  C) (Temporal)   Resp 16   Wt 58.5 kg (129 lb)   LMP  (LMP Unknown)   SpO2 100%   Breastfeeding No   BMI 20.82 kg/m     EXAM:  Alert cooperative, no distress.  Depression anxiety inventories as noted.  She has no formal thought disorder.  No homicidal or suicidal ideations.  ASSESSMENT/Plan :    Bev was seen today for recheck.    Diagnoses and all orders for this visit:    Panic disorder with agoraphobia  -     QUEtiapine (SEROQUEL) 25 MG tablet; Take 1 tablet (25 mg) by mouth At Bedtime    Nausea  -     ondansetron (ZOFRAN-ODT) 4 MG ODT tab; Place 1 tablet (4 mg) under the tongue every 8 hours as needed for nausea    Needs flu shot  -     FLU SHOT 6 MOS - 50 YRS (FLUZONE)    Anxiety  -     QUEtiapine (SEROQUEL) 25 MG tablet; Take 1 tablet (25 mg) by  mouth At Bedtime      At this point, we discussed the possibility of increasing her fluoxetine but elected to add some Seroquel 25 mg at bedtime.  I think this will help with sleep and may help some with some of her anxiety and agoraphobic tendencies.  If she is not improving or has difficulties with that she will let us know.  We discussed the possibility of psychiatric consultation in the future as well.  She will continue with counseling as she has scheduled.  Recommend that she follow-up with me in 3 to 4 weeks, sooner if needed.    Flu shot today.    A total of 25 minutes was spent with the patient, reviewing records, tests, ordering medications, tests or procedures and documenting clinical information in the EHR.     Alfred Ortiz MD

## 2021-09-29 ASSESSMENT — ANXIETY QUESTIONNAIRES: GAD7 TOTAL SCORE: 17

## 2021-10-07 ENCOUNTER — MYC MEDICAL ADVICE (OUTPATIENT)
Dept: FAMILY MEDICINE | Facility: OTHER | Age: 36
End: 2021-10-07

## 2021-10-11 ENCOUNTER — MYC MEDICAL ADVICE (OUTPATIENT)
Dept: FAMILY MEDICINE | Facility: OTHER | Age: 36
End: 2021-10-11

## 2021-10-11 NOTE — TELEPHONE ENCOUNTER
Patient messaging about changes due to s/s with since hysterectomy. Lore Lane LPN .......  10/11/2021  3:39 PM

## 2021-10-25 ENCOUNTER — OFFICE VISIT (OUTPATIENT)
Dept: FAMILY MEDICINE | Facility: OTHER | Age: 36
End: 2021-10-25
Attending: FAMILY MEDICINE
Payer: COMMERCIAL

## 2021-10-25 VITALS
SYSTOLIC BLOOD PRESSURE: 106 MMHG | BODY MASS INDEX: 20.66 KG/M2 | TEMPERATURE: 97.1 F | RESPIRATION RATE: 16 BRPM | OXYGEN SATURATION: 100 % | DIASTOLIC BLOOD PRESSURE: 84 MMHG | WEIGHT: 128 LBS | HEART RATE: 57 BPM

## 2021-10-25 DIAGNOSIS — F40.01 PANIC DISORDER WITH AGORAPHOBIA: Primary | ICD-10-CM

## 2021-10-25 DIAGNOSIS — F41.9 ANXIETY: ICD-10-CM

## 2021-10-25 PROCEDURE — G0463 HOSPITAL OUTPT CLINIC VISIT: HCPCS

## 2021-10-25 PROCEDURE — 99213 OFFICE O/P EST LOW 20 MIN: CPT | Performed by: FAMILY MEDICINE

## 2021-10-25 ASSESSMENT — ANXIETY QUESTIONNAIRES
8. IF YOU CHECKED OFF ANY PROBLEMS, HOW DIFFICULT HAVE THESE MADE IT FOR YOU TO DO YOUR WORK, TAKE CARE OF THINGS AT HOME, OR GET ALONG WITH OTHER PEOPLE?: VERY DIFFICULT
GAD7 TOTAL SCORE: 9
3. WORRYING TOO MUCH ABOUT DIFFERENT THINGS: SEVERAL DAYS
7. FEELING AFRAID AS IF SOMETHING AWFUL MIGHT HAPPEN: NEARLY EVERY DAY
2. NOT BEING ABLE TO STOP OR CONTROL WORRYING: SEVERAL DAYS
5. BEING SO RESTLESS THAT IT IS HARD TO SIT STILL: MORE THAN HALF THE DAYS
6. BECOMING EASILY ANNOYED OR IRRITABLE: NOT AT ALL
7. FEELING AFRAID AS IF SOMETHING AWFUL MIGHT HAPPEN: NEARLY EVERY DAY
1. FEELING NERVOUS, ANXIOUS, OR ON EDGE: SEVERAL DAYS
4. TROUBLE RELAXING: SEVERAL DAYS

## 2021-10-25 ASSESSMENT — PAIN SCALES - GENERAL: PAINLEVEL: NO PAIN (0)

## 2021-10-25 ASSESSMENT — PATIENT HEALTH QUESTIONNAIRE - PHQ9
SUM OF ALL RESPONSES TO PHQ QUESTIONS 1-9: 4
10. IF YOU CHECKED OFF ANY PROBLEMS, HOW DIFFICULT HAVE THESE PROBLEMS MADE IT FOR YOU TO DO YOUR WORK, TAKE CARE OF THINGS AT HOME, OR GET ALONG WITH OTHER PEOPLE: SOMEWHAT DIFFICULT
SUM OF ALL RESPONSES TO PHQ QUESTIONS 1-9: 4

## 2021-10-25 NOTE — PROGRESS NOTES
"Nursing Notes:   Sindi Stacy LPN  10/25/2021 10:01 AM  Signed  Chief Complaint   Patient presents with     RECHECK     anxiety       Initial /84   Pulse 57   Temp 97.1  F (36.2  C) (Temporal)   Resp 16   Wt 58.1 kg (128 lb)   LMP  (LMP Unknown)   SpO2 100%   Breastfeeding No   BMI 20.66 kg/m   Estimated body mass index is 20.66 kg/m  as calculated from the following:    Height as of 7/27/21: 1.676 m (5' 6\").    Weight as of this encounter: 58.1 kg (128 lb).  Medication Reconciliation: complete    FOOD SECURITY SCREENING QUESTIONS  Hunger Vital Signs:  Within the past 12 months we worried whether our food would run out before we got money to buy more. Never  Within the past 12 months the food we bought just didn't last and we didn't have money to get more. Never    Sindi Stacy LPN      SUBJECTIVE:  Bev Shi  is a 35 year old female who comes in today for follow-up.  I last saw her a month ago.  She had begun seeing Autumn for counseling.  She was still having some agoraphobic symptoms and was afraid to be alone when her  is out of town racing.  She continues to have lots of psychosocial stressors. At her last visit, we added some Seroquel at bedtime to help with sleep and anxiety and agoraphobic tendencies.  We discussed possible psychiatric referral and possibly increasing her fluoxetine as a next step.    She got her flu shot at her last visit but still has not had COVID-19 vaccine.    She is taking 12.5 mg Seroquel at bedtime and has stopped her Fluoxetine because she was losing weight. She is now using only prn Klonopin.      She is still having some stress with her son who has had some conflict with his dad. He is going back to see his mental health provider this week.     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     Past Medical, Family, and Social History reviewed and updated as noted below.   ROS is negative except as noted above       Allergies   Allergen " Reactions     Hydrocodone Shortness Of Breath     Amoxicillin Rash     Codeine Nausea     Diagnostic X-Ray Materials Rash     Pt developed red rash on chest and hands from Volumen for MRI scan.  Recommend premed prior scans (benadryl)      Penicillins Rash     Piperacillin-Tazobactam In D5w Rash   , History reviewed. No pertinent family history.,   Current Outpatient Medications   Medication     aluminum chloride (DRYSOL) 20 % external solution     Ascorbic Acid (VITAMIN C) 500 MG CHEW     cholecalciferol (VITAMIN D3) 25 mcg (1000 units) capsule     clonazePAM (KLONOPIN) 0.5 MG tablet     dicyclomine (BENTYL) 10 MG capsule     ELDERBERRY PO     omeprazole (PRILOSEC) 20 MG DR capsule     ondansetron (ZOFRAN-ODT) 4 MG ODT tab     QUEtiapine (SEROQUEL) 25 MG tablet     valACYclovir (VALTREX) 1000 mg tablet     zinc gluconate 50 MG tablet     No current facility-administered medications for this visit.   ,   Past Medical History:   Diagnosis Date     Atrial septal defect     Venous-type     Breast lump     No Comments Provided     Pain in elbow     No Comments Provided     Personal history of other medical treatment (CODE)          Streptococcal pharyngitis     intermittent   ,   Patient Active Problem List    Diagnosis Date Noted     Axillary hyperhidrosis 2020     Priority: Medium     Moderate recurrent major depression (H) 2019     Priority: Medium     Panic disorder with agoraphobia 2016     Priority: Medium     Vitamin D insufficiency 2015     Priority: Medium     Anxiety 2015     Priority: Medium     Migraine without aura 2015     Priority: Medium     GERD (gastroesophageal reflux disease) 2014     Priority: Medium     H/O atrial septal defect repair 2014     Priority: Medium   ,   Past Surgical History:   Procedure Laterality Date     APPENDECTOMY OPEN      3/10/15,Appendectomy     LAPAROSCOPIC HYSTERECTOMY TOTAL, SALPINGECTOMY  2016    Left.  Dr. Martinez      LAPAROSCOPIC TUBAL LIGATION      3/10/15     OTHER SURGICAL HISTORY      11/98,876445,OTHER,repair of a sinus/venus type atrioseptal defect     OTHER SURGICAL HISTORY      2013,165261,OTHER,Dr. Martinez, [facility]     OTHER SURGICAL HISTORY      3/10/2015,76419.0,[facility] LAP TUBAL CAUTERY     OTHER SURGICAL HISTORY      3/14/2015,807415,OTHER    and   Social History     Tobacco Use     Smoking status: Light Tobacco Smoker     Packs/day: 0.10     Types: Cigarettes     Smokeless tobacco: Never Used     Tobacco comment: working on it   Substance Use Topics     Alcohol use: Yes     Alcohol/week: 0.0 standard drinks     Comment: Alcoholic Drinks/day: occ     OBJECTIVE:  /84   Pulse 57   Temp 97.1  F (36.2  C) (Temporal)   Resp 16   Wt 58.1 kg (128 lb)   LMP  (LMP Unknown)   SpO2 100%   Breastfeeding No   BMI 20.66 kg/m     EXAM:  Alert and cooperative, no distress.  She is more relaxed and conversant today appears much less anxious.  Depression and anxiety inventories have improved.  ASSESSMENT/Plan :    Bev was seen today for recheck.    Diagnoses and all orders for this visit:    Panic disorder with agoraphobia    Anxiety      Okay to leave off fluoxetine and continue Seroquel at bedtime with as needed clonazepam.  She will continue with counseling and follow-up for an as-needed basis.  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  She will keep in touch with her progress.    Discussed COVID-19 vaccination.  She had the disease about a year ago and it would be fine to proceed with the vaccination at any point.  Questions were answered.    A total of 23 minutes was spent with the patient, reviewing records, tests, ordering medications, tests or procedures and documenting clinical information in the EHR.     Alfred Ortiz MD            Answers for HPI/ROS submitted by the patient on 10/25/2021  If you checked off any problems, how difficult have these  problems made it for you to do your work, take care of things at home, or get along with other people?: Somewhat difficult  PHQ9 TOTAL SCORE: 4  LAI 7 TOTAL SCORE: 9

## 2021-10-26 ASSESSMENT — ANXIETY QUESTIONNAIRES: GAD7 TOTAL SCORE: 9

## 2022-01-29 ENCOUNTER — HEALTH MAINTENANCE LETTER (OUTPATIENT)
Age: 37
End: 2022-01-29

## 2022-01-31 DIAGNOSIS — F40.01 PANIC DISORDER WITH AGORAPHOBIA: ICD-10-CM

## 2022-02-01 RX ORDER — CLONAZEPAM 0.5 MG/1
TABLET ORAL
Qty: 60 TABLET | Refills: 5 | Status: SHIPPED | OUTPATIENT
Start: 2022-02-01 | End: 2022-08-09

## 2022-02-01 NOTE — TELEPHONE ENCOUNTER
[facility] sent Rx request for the following:      Requested Prescriptions   Pending Prescriptions Disp Refills     clonazePAM (KLONOPIN) 0.5 MG tablet [Pharmacy Med Name: CLONAZEPAM 0.5MG TABLETS] 60 tablet      Sig: TAKE 1/2 TO 1 TABLET BY MOUTH TWICE DAILY AS NEEDED       There is no refill protocol information for this order        Last Prescription Date:   7/27/2021  Last Fill Qty/Refills:         60, R-5    Last Office Visit:              10/25/2021   Future Office visit:           None  Routing refill request to provider for review/approval because:  Drug not on the [facility], [facility] or [facility] refill protocol or controlled substance, Unable to complete prescription refill per RN Medication Refill Policy. Leonor Jenkins RN on 2/1/2022 at 9:41 AM

## 2022-05-04 NOTE — LETTER
[facility]  [address]  [place] [address]  Phone: [phone]  Fax: [phone]    August 16, 2021        Bev Shi  [address] [address]          To whom it may concern:    RE: Bev Shi    Patient was seen and treated at our clinic. She is unable to work at this time due to health issues.  The prognosis is unclear at this time and a return to work date is uncertain.    Please contact me for questions or concerns.      Sincerely,        Alfred Ortiz MD   Operative Note    Patient: Yamini Bravo 75 year old female    MRN: [id number]    Surgeon(s): Aldo Beavers MD  Phone Number: [phone]                       Surgeon(s) and Role:     * Aldo Beavers MD - Primary    Assistant(s): Hany    Pre-Op Diagnosis: Septic arthritis right ankle with retained hardware     Post-Op Diagnosis: Same     Procedure: 1.  Incision and drainage right ankle    2.  Removal of hardware subtalar joint, right    Anesthesia Type: General                                   Complications: None    Specimens Removed: Cultures sent     Estimated Blood Loss: 5 mL    Assistant Tasks: Opening and closing    Procedure: Slight was brought to the operating laid in the supine position.  She was anesthetized.  The she was then turned into the left lateral decubitus position.  Right lower extremity prepped and draped in usual fashion.  I began procedure by performing incision and drainage of her ankle.  An anteromedial approach was made to the ankle.  A 3 cm incision was made over the medial gutter.  Skin subcutaneous tissues were incised.  I bluntly dissected joint capsule and penetrated the tibiotalar joint capsule.  Upon doing so purulent fluid drained forth.  This was cultured.  Copious amount of saline was irrigated through the ankle joint.  Once this was cleaned we loosely closed the skin with 3-0 nylon suture.  Small amount of iodoform gauze was used to pack the wound facilitate drainage.    I then made a posterior incision over the calcaneus, remote from our first incision.  Eight 2 cm incision was made over the heel.  I bluntly dissected down to the calcaneus and identified the screw heads.  Each of the 2 large cannulated screws was backed out in its entirety.  There is no infection in this particular location.  Specifically no purulence.  This wound was irrigated and closed with 3-0 nylon.  Sterile dressings applied.  She tolerated this well without difficulty.

## 2022-08-08 DIAGNOSIS — F40.01 PANIC DISORDER WITH AGORAPHOBIA: ICD-10-CM

## 2022-08-09 RX ORDER — CLONAZEPAM 0.5 MG/1
TABLET ORAL
Qty: 60 TABLET | Refills: 5 | Status: SHIPPED | OUTPATIENT
Start: 2022-08-09 | End: 2023-04-05

## 2022-08-09 NOTE — TELEPHONE ENCOUNTER
Called pt and transferred her to schedule as it has been over 6 months since her last visit.  Please review for a short fill as next available appt was 9/7/22 which pt is scheduled for.  Deyanira Live RN on 8/9/2022 at 12:47 PM    Last Prescription Date: 2/1/22  Last Qty/Refills: 60 / 5  Last Office Visit: 10/25/21  Future Office Visit: None     Requested Prescriptions   Pending Prescriptions Disp Refills     clonazePAM (KLONOPIN) 0.5 MG tablet [Pharmacy Med Name: CLONAZEPAM 0.5MG TABLETS] 60 tablet      Sig: TAKE 1/2 TO 1 TABLET BY MOUTH TWICE DAILY AS NEEDED       There is no refill protocol information for this order

## 2022-09-06 NOTE — PROGRESS NOTES
Nursing Notes:   Mamta Reynagatiffany THOMASON, LPN  9/7/2022  9:08 AM  Signed  Chief Complaint   Patient presents with     Recheck Medication     Here today with concerns of anxiety and depression. Celexa was just increased to 40mg. Starting to feel somewhat different. More energy.     Medication Reconciliation: complete    Yas Reynaga LPN               Mary Pablo is a 36 year old, presenting for the following health issues:  Recheck Medication      History of Present Illness       Mental Health Follow-up:  Patient presents to follow-up on Depression & Anxiety.Patient's depression since last visit has been:  Medium  The patient is not having other symptoms associated with depression.  Patient's anxiety since last visit has been:  Medium  The patient is not having other symptoms associated with anxiety.  Any significant life events: financial concerns and grief or loss  Patient is feeling anxious or having panic attacks.  Patient has no concerns about alcohol or drug use.    She eats 2-3 servings of fruits and vegetables daily.She consumes 1 sweetened beverage(s) daily.She exercises with enough effort to increase her heart rate 20 to 29 minutes per day.  She exercises with enough effort to increase her heart rate 7 days per week.   She is taking medications regularly.    Today's PHQ-9         PHQ-9 Total Score: 9    PHQ-9 Q9 Thoughts of better off dead/self-harm past 2 weeks :   Not at all    How difficult have these problems made it for you to do your work, take care of things at home, or get along with other people: Somewhat difficult  Today's LAI-7 Score: 16       Med Check       Review of Systems         Objective    /74 (BP Location: Right arm, Patient Position: Sitting, Cuff Size: Adult Regular)   Pulse 62   Temp 98.1  F (36.7  C) (Tympanic)   Resp 16   Wt 54.9 kg (121 lb)   LMP  (LMP Unknown)   SpO2 98%   Breastfeeding No   BMI 19.53 kg/m    Body mass index is 19.53 kg/m .  Physical Exam                        SUBJECTIVE:  Bev Shi  is a 36 year old female who comes in today for follow-up and medication management.  I last saw her in October to follow-up on her anxiety.  She changed jobs which seem to help.  She was using 12.5 mg of Seroquel at bedtime and had stopped fluoxetine because she was losing weight and was using Klonopin on a as needed basis.  She was following with her counselor.    She has not had COVID-19 vaccine but has had the disease.    PHQ 2021 10/25/2021 2022   PHQ-9 Total Score 13 4 9   Q9: Thoughts of better off dead/self-harm past 2 weeks Not at all Not at all Not at all     LAI-7 SCORE 2021 10/25/2021 2022   Total Score 17 (severe anxiety) 9 (mild anxiety) 16 (severe anxiety)   Total Score 17 9 16     She lost her dad unexpectedly from a brain aneurysm in January at 75 (his mom also had an aneurysm and ). He had 3 different aneurysm surgeries.  After that she lost an uncle who had a brain injury when he fell down stairs.(her dad's brother).  He fell off a ladder and had a small brain bleed.  She lost a great uncle from heart issues on her mom's side.  Her 's niece  of accidental overdose at 22.  Her 's brother committed suicide in [place]. She has talked a bit to her counselor.  Her ex  found out he has a child he didn't know about.     She didn't have anxiety through all her tragedies, but once the dust settled she did. She has been taking Klonopin at night but rarely will take it during the day. She saw Liseth Manciniemmett in [place] and was started on citalopram and now is on 40 mg on .     She had Covid in 2022.    She loves her new job.  They have been good to give her the time off necessary for all of her upheaval.      Past Medical, Family, and Social History reviewed and updated as noted below.   ROS is negative except as noted above       Allergies   Allergen Reactions     Hydrocodone Shortness Of  Breath     Amoxicillin Rash     Codeine Nausea     Diagnostic X-Ray Materials Rash     Pt developed red rash on chest and hands from Volumen for MRI scan.  Recommend premed prior scans (benadryl)      Morphine Nausea     Penicillins Rash     Piperacillin-Tazobactam In D5w Rash   , No family history on file.,   Current Outpatient Medications   Medication     aluminum chloride (DRYSOL) 20 % external solution     Ascorbic Acid (VITAMIN C) 500 MG CHEW     cholecalciferol (VITAMIN D3) 25 mcg (1000 units) capsule     citalopram (CELEXA) 40 MG tablet     clonazePAM (KLONOPIN) 0.5 MG tablet     dicyclomine (BENTYL) 10 MG capsule     ELDERBERRY PO     omeprazole (PRILOSEC) 20 MG DR capsule     ondansetron (ZOFRAN-ODT) 4 MG ODT tab     valACYclovir (VALTREX) 1000 mg tablet     zinc gluconate 50 MG tablet     No current facility-administered medications for this visit.   ,   Past Medical History:   Diagnosis Date     Atrial septal defect     Venous-type     Breast lump     No Comments Provided     Pain in elbow     No Comments Provided     Personal history of other medical treatment (CODE)          Streptococcal pharyngitis     intermittent   ,   Patient Active Problem List    Diagnosis Date Noted     Family history of brain aneurysm 2022     Priority: Medium     Axillary hyperhidrosis 2020     Priority: Medium     Moderate recurrent major depression (H) 2019     Priority: Medium     Panic disorder with agoraphobia 2016     Priority: Medium     Vitamin D insufficiency 2015     Priority: Medium     Anxiety 2015     Priority: Medium     Migraine without aura 2015     Priority: Medium     GERD (gastroesophageal reflux disease) 2014     Priority: Medium     H/O atrial septal defect repair 2014     Priority: Medium   ,   Past Surgical History:   Procedure Laterality Date     APPENDECTOMY OPEN      3/10/15,Appendectomy     LAPAROSCOPIC HYSTERECTOMY TOTAL, SALPINGECTOMY   2016    Left.  Dr. Martinez     LAPAROSCOPIC TUBAL LIGATION      3/10/15     OTHER SURGICAL HISTORY      ,[id number],OTHER,repair of a sinus/venus type atrioseptal defect     OTHER SURGICAL HISTORY      ,[id number],OTHER,Dr. Martinez, [facility]     OTHER SURGICAL HISTORY      3/10/2015,65232.0,AR LAP TUBAL CAUTERY     OTHER SURGICAL HISTORY      3/14/2015,716261,OTHER    and   Social History     Tobacco Use     Smoking status: Light Tobacco Smoker     Packs/day: 0.10     Types: Cigarettes     Smokeless tobacco: Never Used     Tobacco comment: working on it   Substance Use Topics     Alcohol use: Yes     Alcohol/week: 0.0 standard drinks     Comment: Alcoholic Drinks/day: occ     OBJECTIVE:  /74 (BP Location: Right arm, Patient Position: Sitting, Cuff Size: Adult Regular)   Pulse 62   Temp 98.1  F (36.7  C) (Tympanic)   Resp 16   Wt 54.9 kg (121 lb)   LMP  (LMP Unknown)   SpO2 98%   Breastfeeding No   BMI 19.53 kg/m     EXAM:  Alert and cooperative, no distress.  Affect is appropriate.  She is appropriately tearful when talking about her losses but no formal thought disorder and no homicidal or suicidal ideations.  ASSESSMENT/Plan :    Bev was seen today for recheck medication.    Diagnoses and all orders for this visit:    Anxiety    Panic disorder with agoraphobia    Grief reaction    Family history of brain aneurysm  Comments:  Father  of cerebral aneurysm at 75, his mother did as well.  Orders:  -     MRA Brain ([place]) wo Contrast; Future    Moderate recurrent major depression (H)      I think staying on the citalopram is a good idea.  She uses the Klonopin typically nightly and very rarely other times of the day and certainly should have shown no evidence of increased use or abuse.  Renewed that medication for her.  She typically fills this about every 2 months or so.  PDMP Review       Value Time User    State PDMP site checked  Yes 2022  9:33 AM Alfred Ortiz  MD         With her family history of aneurysm even though she only has 1 first-degree relative with an aneurysm, his mother also had an aneurysm and her dad's brother  of a brain hemorrhage after traumatic brain injury so it is unclear whether he might of had an aneurysm although it does not seem that one was identified on autopsy.  Reviewing Up-To-Date, she would have a 1 to 4% chance of potentially having an aneurysm.  If she had 2 first-degree relatives it would be 7%.  Given that the background incidence is about 2% I think this is enough to justify doing an MRI.  There is suggestion will be to do 1 yearly for 3 years to establish stability and then every 5 years.  Given her young age, I think doing it at least once for a baseline would be reasonable and potentially increasing surveillance if nothing is found as she gets older.    Support and encouragement offered with regard to her losses.  Encouraged her to follow through with her counselor.    A total of 32 minutes was spent with the patient, reviewing records, tests, ordering medications, tests or procedures and documenting clinical information in the EHR.     Alfred Ortiz MD

## 2022-09-07 ENCOUNTER — OFFICE VISIT (OUTPATIENT)
Dept: FAMILY MEDICINE | Facility: OTHER | Age: 37
End: 2022-09-07
Attending: FAMILY MEDICINE
Payer: COMMERCIAL

## 2022-09-07 VITALS
DIASTOLIC BLOOD PRESSURE: 74 MMHG | HEART RATE: 62 BPM | TEMPERATURE: 98.1 F | RESPIRATION RATE: 16 BRPM | BODY MASS INDEX: 19.53 KG/M2 | SYSTOLIC BLOOD PRESSURE: 106 MMHG | OXYGEN SATURATION: 98 % | WEIGHT: 121 LBS

## 2022-09-07 DIAGNOSIS — F41.9 ANXIETY: Primary | ICD-10-CM

## 2022-09-07 DIAGNOSIS — F40.01 PANIC DISORDER WITH AGORAPHOBIA: ICD-10-CM

## 2022-09-07 DIAGNOSIS — F43.21 GRIEF REACTION: ICD-10-CM

## 2022-09-07 DIAGNOSIS — F33.1 MODERATE RECURRENT MAJOR DEPRESSION (H): ICD-10-CM

## 2022-09-07 DIAGNOSIS — Z82.49 FAMILY HISTORY OF BRAIN ANEURYSM: ICD-10-CM

## 2022-09-07 PROCEDURE — G0463 HOSPITAL OUTPT CLINIC VISIT: HCPCS

## 2022-09-07 PROCEDURE — 99214 OFFICE O/P EST MOD 30 MIN: CPT | Performed by: FAMILY MEDICINE

## 2022-09-07 RX ORDER — CITALOPRAM HYDROBROMIDE 40 MG/1
40 TABLET ORAL DAILY
COMMUNITY
Start: 2022-08-24

## 2022-09-07 ASSESSMENT — PATIENT HEALTH QUESTIONNAIRE - PHQ9
SUM OF ALL RESPONSES TO PHQ QUESTIONS 1-9: 9
10. IF YOU CHECKED OFF ANY PROBLEMS, HOW DIFFICULT HAVE THESE PROBLEMS MADE IT FOR YOU TO DO YOUR WORK, TAKE CARE OF THINGS AT HOME, OR GET ALONG WITH OTHER PEOPLE: SOMEWHAT DIFFICULT
SUM OF ALL RESPONSES TO PHQ QUESTIONS 1-9: 9

## 2022-09-07 ASSESSMENT — ANXIETY QUESTIONNAIRES
8. IF YOU CHECKED OFF ANY PROBLEMS, HOW DIFFICULT HAVE THESE MADE IT FOR YOU TO DO YOUR WORK, TAKE CARE OF THINGS AT HOME, OR GET ALONG WITH OTHER PEOPLE?: SOMEWHAT DIFFICULT
GAD7 TOTAL SCORE: 16
4. TROUBLE RELAXING: SEVERAL DAYS
GAD7 TOTAL SCORE: 16
5. BEING SO RESTLESS THAT IT IS HARD TO SIT STILL: NEARLY EVERY DAY
7. FEELING AFRAID AS IF SOMETHING AWFUL MIGHT HAPPEN: NEARLY EVERY DAY
1. FEELING NERVOUS, ANXIOUS, OR ON EDGE: NEARLY EVERY DAY
6. BECOMING EASILY ANNOYED OR IRRITABLE: MORE THAN HALF THE DAYS
7. FEELING AFRAID AS IF SOMETHING AWFUL MIGHT HAPPEN: NEARLY EVERY DAY
3. WORRYING TOO MUCH ABOUT DIFFERENT THINGS: NEARLY EVERY DAY
2. NOT BEING ABLE TO STOP OR CONTROL WORRYING: SEVERAL DAYS
GAD7 TOTAL SCORE: 16
IF YOU CHECKED OFF ANY PROBLEMS ON THIS QUESTIONNAIRE, HOW DIFFICULT HAVE THESE PROBLEMS MADE IT FOR YOU TO DO YOUR WORK, TAKE CARE OF THINGS AT HOME, OR GET ALONG WITH OTHER PEOPLE: SOMEWHAT DIFFICULT

## 2022-09-07 ASSESSMENT — PAIN SCALES - GENERAL: PAINLEVEL: NO PAIN (0)

## 2022-09-07 NOTE — NURSING NOTE
Chief Complaint   Patient presents with     Recheck Medication     Here today with concerns of anxiety and depression. Celexa was just increased to 40mg. Starting to feel somewhat different. More energy.     Medication Reconciliation: complete    Yas Reynaga LPN

## 2022-09-13 ENCOUNTER — HOSPITAL ENCOUNTER (OUTPATIENT)
Dept: MRI IMAGING | Facility: OTHER | Age: 37
Discharge: HOME | End: 2022-09-13
Attending: FAMILY MEDICINE | Admitting: FAMILY MEDICINE
Payer: COMMERCIAL

## 2022-09-13 DIAGNOSIS — Z82.49 FAMILY HISTORY OF BRAIN ANEURYSM: ICD-10-CM

## 2022-09-13 PROCEDURE — 70544 MR ANGIOGRAPHY HEAD W/O DYE: CPT

## 2022-09-17 ENCOUNTER — HEALTH MAINTENANCE LETTER (OUTPATIENT)
Age: 37
End: 2022-09-17

## 2022-09-19 ENCOUNTER — TELEPHONE (OUTPATIENT)
Dept: FAMILY MEDICINE | Facility: OTHER | Age: 37
End: 2022-09-19

## 2022-09-19 DIAGNOSIS — Z91.041 CONTRAST MEDIA ALLERGY: Primary | ICD-10-CM

## 2022-09-19 RX ORDER — DIPHENHYDRAMINE HCL 50 MG
CAPSULE ORAL
Qty: 1 CAPSULE | Refills: 0 | Status: SHIPPED | OUTPATIENT
Start: 2022-09-19

## 2022-09-19 RX ORDER — METHYLPREDNISOLONE 32 MG/1
TABLET ORAL
Qty: 2 TABLET | Refills: 0 | Status: SHIPPED | OUTPATIENT
Start: 2022-09-19

## 2022-09-19 NOTE — TELEPHONE ENCOUNTER
[facility] called and stated the patient is having an MRI arthrogram on 10/14/2022. She told [facility] she has a contrast allergy as she gets hives. The are requesting pre medication for her for this. Can they get Rx for 32 mg medrol orally to take 12 hours and 2 hours before and Benadryl 50 mg to take 1 hour before.  Sindi Stacy LPN..................9/19/2022   3:43 PM

## 2022-09-20 NOTE — TELEPHONE ENCOUNTER
After the patient's name and date of birth was verified, the patient was told the below information.  Sindi Stacy LPN..................9/20/2022   12:22 PM

## 2023-02-13 ENCOUNTER — TELEPHONE (OUTPATIENT)
Dept: FAMILY MEDICINE | Facility: OTHER | Age: 38
End: 2023-02-13
Payer: COMMERCIAL

## 2023-02-13 ENCOUNTER — MYC MEDICAL ADVICE (OUTPATIENT)
Dept: FAMILY MEDICINE | Facility: OTHER | Age: 38
End: 2023-02-13
Payer: COMMERCIAL

## 2023-02-13 DIAGNOSIS — R11.0 NAUSEA: ICD-10-CM

## 2023-02-13 RX ORDER — ONDANSETRON 4 MG/1
4 TABLET, ORALLY DISINTEGRATING ORAL EVERY 8 HOURS PRN
Qty: 20 TABLET | Refills: 1 | Status: SHIPPED | OUTPATIENT
Start: 2023-02-13 | End: 2023-02-13

## 2023-02-13 RX ORDER — ONDANSETRON 4 MG/1
4 TABLET, ORALLY DISINTEGRATING ORAL EVERY 8 HOURS PRN
Qty: 20 TABLET | Refills: 1 | Status: SHIPPED | OUTPATIENT
Start: 2023-02-13

## 2023-02-13 NOTE — TELEPHONE ENCOUNTER
Pt is on vacation and she needs to get a Zofran prescription sent to [facility] in [place], [place].  She needs about 5 days worth.  Please call.    Johnathan Rea on 2/13/2023 at 11:40 AM

## 2023-04-03 DIAGNOSIS — F40.01 PANIC DISORDER WITH AGORAPHOBIA: ICD-10-CM

## 2023-04-05 RX ORDER — CLONAZEPAM 0.5 MG/1
TABLET ORAL
Qty: 60 TABLET | Refills: 5 | Status: SHIPPED | OUTPATIENT
Start: 2023-04-05

## 2023-04-05 NOTE — TELEPHONE ENCOUNTER
[facility] sent Rx request for the following:      Requested Prescriptions   Pending Prescriptions Disp Refills     clonazePAM (KLONOPIN) 0.5 MG tablet [Pharmacy Med Name: CLONAZEPAM 0.5MG TABLETS] 60 tablet      Sig: TAKE 1/2 TO 1 TABLET BY MOUTH TWICE DAILY AS NEEDED   Last Prescription Date:   8/9/22  Last Fill Qty/Refills:         60, R-5    Last Office Visit:              9/7/22   Future Office visit:           None    Per LOV note: Return in about 6 months (around 3/7/2023).    Pt due for 6-month follow up. Routing to provider for refill consideration. Routing to  OUTREACH APPT REQUESTS pool, to assist Pt in scheduling appointment.     Lore Patel RN .............. 4/5/2023  10:11 AM

## 2023-05-06 ENCOUNTER — HEALTH MAINTENANCE LETTER (OUTPATIENT)
Age: 38
End: 2023-05-06

## 2023-12-16 NOTE — NURSING NOTE
"Chief Complaint   Patient presents with     RECHECK     anxiety       Initial /84   Pulse 57   Temp 97.1  F (36.2  C) (Temporal)   Resp 16   Wt 58.1 kg (128 lb)   LMP  (LMP Unknown)   SpO2 100%   Breastfeeding No   BMI 20.66 kg/m   Estimated body mass index is 20.66 kg/m  as calculated from the following:    Height as of 7/27/21: 1.676 m (5' 6\").    Weight as of this encounter: 58.1 kg (128 lb).  Medication Reconciliation: complete    FOOD SECURITY SCREENING QUESTIONS  Hunger Vital Signs:  Within the past 12 months we worried whether our food would run out before we got money to buy more. Never  Within the past 12 months the food we bought just didn't last and we didn't have money to get more. Never    Sindi Stacy, ABNER  "
Home

## 2024-07-13 ENCOUNTER — HEALTH MAINTENANCE LETTER (OUTPATIENT)
Age: 39
End: 2024-07-13

## 2024-08-24 ENCOUNTER — APPOINTMENT (OUTPATIENT)
Dept: ULTRASOUND IMAGING | Facility: OTHER | Age: 39
End: 2024-08-24
Attending: EMERGENCY MEDICINE

## 2024-08-24 ENCOUNTER — HOSPITAL ENCOUNTER (EMERGENCY)
Facility: OTHER | Age: 39
Discharge: HOME | End: 2024-08-24
Attending: EMERGENCY MEDICINE | Admitting: EMERGENCY MEDICINE

## 2024-08-24 VITALS
RESPIRATION RATE: 20 BRPM | DIASTOLIC BLOOD PRESSURE: 67 MMHG | HEART RATE: 63 BPM | WEIGHT: 121 LBS | BODY MASS INDEX: 19.44 KG/M2 | SYSTOLIC BLOOD PRESSURE: 105 MMHG | OXYGEN SATURATION: 97 % | HEIGHT: 66 IN | TEMPERATURE: 98.1 F

## 2024-08-24 DIAGNOSIS — R10.9 ABDOMINAL PAIN, UNSPECIFIED ABDOMINAL LOCATION: ICD-10-CM

## 2024-08-24 PROCEDURE — 99283 EMERGENCY DEPT VISIT LOW MDM: CPT | Performed by: EMERGENCY MEDICINE

## 2024-08-24 PROCEDURE — 250N000011 HC RX IP 250 OP 636: Performed by: EMERGENCY MEDICINE

## 2024-08-24 PROCEDURE — 96372 THER/PROPH/DIAG INJ SC/IM: CPT | Performed by: EMERGENCY MEDICINE

## 2024-08-24 PROCEDURE — 76856 US EXAM PELVIC COMPLETE: CPT

## 2024-08-24 PROCEDURE — 99285 EMERGENCY DEPT VISIT HI MDM: CPT | Mod: 25 | Performed by: EMERGENCY MEDICINE

## 2024-08-24 RX ORDER — ONDANSETRON 4 MG/1
4 TABLET, ORALLY DISINTEGRATING ORAL ONCE
Status: COMPLETED | OUTPATIENT
Start: 2024-08-24 | End: 2024-08-24

## 2024-08-24 RX ORDER — FENTANYL CITRATE 50 UG/ML
25 INJECTION, SOLUTION INTRAMUSCULAR; INTRAVENOUS ONCE
Status: COMPLETED | OUTPATIENT
Start: 2024-08-24 | End: 2024-08-24

## 2024-08-24 RX ADMIN — FENTANYL CITRATE 25 MCG: 50 INJECTION INTRAMUSCULAR; INTRAVENOUS at 15:07

## 2024-08-24 RX ADMIN — ONDANSETRON 4 MG: 4 TABLET, ORALLY DISINTEGRATING ORAL at 15:07

## 2024-08-24 RX ADMIN — FENTANYL CITRATE 25 MCG: 50 INJECTION INTRAMUSCULAR; INTRAVENOUS at 17:19

## 2024-08-24 ASSESSMENT — ENCOUNTER SYMPTOMS
RESPIRATORY NEGATIVE: 1
NAUSEA: 0
MUSCULOSKELETAL NEGATIVE: 1
CARDIOVASCULAR NEGATIVE: 1
NECK STIFFNESS: 0
VOMITING: 0
PSYCHIATRIC NEGATIVE: 1
NEUROLOGICAL NEGATIVE: 1
ALLERGIC/IMMUNOLOGIC NEGATIVE: 1
CONSTITUTIONAL NEGATIVE: 1
HEMATOLOGIC/LYMPHATIC NEGATIVE: 1
MYALGIAS: 0
EYES NEGATIVE: 1
ENDOCRINE NEGATIVE: 1
NECK PAIN: 0
ABDOMINAL PAIN: 1

## 2024-08-24 ASSESSMENT — COLUMBIA-SUICIDE SEVERITY RATING SCALE - C-SSRS
6. HAVE YOU EVER DONE ANYTHING, STARTED TO DO ANYTHING, OR PREPARED TO DO ANYTHING TO END YOUR LIFE?: NO
1. IN THE PAST MONTH, HAVE YOU WISHED YOU WERE DEAD OR WISHED YOU COULD GO TO SLEEP AND NOT WAKE UP?: NO
2. HAVE YOU ACTUALLY HAD ANY THOUGHTS OF KILLING YOURSELF IN THE PAST MONTH?: NO

## 2024-08-24 ASSESSMENT — ACTIVITIES OF DAILY LIVING (ADL)
ADLS_ACUITY_SCORE: 35
ADLS_ACUITY_SCORE: 33

## 2024-08-24 NOTE — ED PROVIDER NOTES
History     Chief Complaint   Patient presents with    Ultrasound    Abdominal Pain     HPI  Bev Shi is a 38 year old female who presents today with complaints of abdominal pain that began this morning.  Was seen at [place] emergency department where workup including labs and CT scan were done which showed no acute findings.  Patient referred to our ER for an ultrasound to rule out ovarian torsion.  Patient still continues to complain of lower abdominal pain.  No additional complaints    Allergies:  Allergies   Allergen Reactions    Hydrocodone Shortness Of Breath    Amoxicillin Rash    Codeine Nausea    Iodinated Contrast Media Rash     Pt developed red rash on chest and hands from [facility] for MRI scan.    Recommend premed prior scans (benadryl)    Pt developed red rash on chest and hands from [facility] for MRI scan.   Recommend premed prior scans (benadryl)    8/24/24. Received IV CT Contrast with no adverse reactions. Was premedicated with 10mg Decadron, 25mg Benadryl.    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     Priority: Medium    Moderate recurrent major depression (H) 01/02/2019     Priority: Medium    Panic disorder with agoraphobia 01/25/2016     Priority: Medium    Vitamin D insufficiency 12/28/2015     Priority: Medium    Anxiety 12/07/2015     Priority: Medium    Migraine without aura 07/09/2015     Priority: Medium    GERD (gastroesophageal reflux disease) 04/30/2014     Priority: Medium    H/O atrial septal defect repair 04/04/2014     Priority: Medium        Past Medical History:    Past Medical History:   Diagnosis Date    Atrial septal defect     Breast lump     Pain in elbow     Personal history of other medical treatment (CODE)      Streptococcal pharyngitis        Past Surgical History:    Past Surgical History:   Procedure Laterality Date    APPENDECTOMY OPEN      3/10/15,Appendectomy    LAPAROSCOPIC HYSTERECTOMY TOTAL, SALPINGECTOMY  03/29/2016    Left.  Dr. Martinez    LAPAROSCOPIC TUBAL LIGATION      3/10/15    OTHER SURGICAL HISTORY      11/98,486820,OTHER,repair of a sinus/venus type atrioseptal defect    OTHER SURGICAL HISTORY      2013,[id number],OTHER,Dr. Martinez, [facility]    OTHER SURGICAL HISTORY      3/10/2015,69675.0,NE LAP TUBAL CAUTERY    OTHER SURGICAL HISTORY      3/14/2015,678183,OTHER       Family History:    No family history on file.    Social History:  Marital Status:   [2]  Social History     Tobacco Use    Smoking status: Light Smoker     Current packs/day: 0.10     Types: Cigarettes    Smokeless tobacco: Never    Tobacco comments:     working on it   Vaping Use    Vaping status: Never Used   Substance Use Topics    Alcohol use: Yes     Alcohol/week: 0.0 standard drinks of alcohol     Comment: Alcoholic Drinks/day: occ    Drug use: No     Comment: Drug use: No        Medications:    aluminum chloride (DRYSOL) 20 % external solution  Ascorbic Acid (VITAMIN C) 500 MG CHEW  cholecalciferol (VITAMIN D3) 25 mcg (1000 units) capsule  citalopram (CELEXA) 40 MG tablet  clonazePAM (KLONOPIN) 0.5 MG tablet  dicyclomine (BENTYL) 10 MG capsule  diphenhydrAMINE (BENADRYL) 50 MG capsule  ELDERBERRY PO  methylPREDNISolone (MEDROL) 32 MG tablet  omeprazole (PRILOSEC) 20 MG DR capsule  ondansetron (ZOFRAN ODT) 4 MG ODT tab  valACYclovir (VALTREX) 1000 mg tablet  zinc gluconate 50 MG tablet          Review of Systems   Constitutional: Negative.    HENT: Negative.     Eyes: Negative.    Respiratory: Negative.     Cardiovascular: Negative.    Gastrointestinal:  Positive for abdominal pain. Negative for nausea and vomiting.   Endocrine: Negative.    Genitourinary: Negative.    Musculoskeletal: Negative.  Negative for myalgias, neck  "pain and neck stiffness.   Skin: Negative.    Allergic/Immunologic: Negative.    Neurological: Negative.    Hematological: Negative.    Psychiatric/Behavioral: Negative.         Physical Exam   BP: 105/67  Pulse: 63  Temp: 98.1  F (36.7  C)  Resp: 20  Height: 167.6 cm (5' 6\")  Weight: 54.9 kg (121 lb)  SpO2: 96 %      Physical Exam  Constitutional:       General: She is not in acute distress.     Appearance: She is well-developed and normal weight. She is not toxic-appearing.   Cardiovascular:      Rate and Rhythm: Normal rate and regular rhythm.   Pulmonary:      Effort: Pulmonary effort is normal.   Abdominal:      General: Abdomen is flat and scaphoid.      Tenderness: There is abdominal tenderness in the left upper quadrant and left lower quadrant.   Skin:     General: Skin is warm and dry.   Neurological:      General: No focal deficit present.      Mental Status: She is alert.   Psychiatric:         Mood and Affect: Mood normal. Mood is not anxious.         Behavior: Behavior normal.         ED Course     ED Course as of 24 1752   Sat Aug 24, 2024   1745 Ultrasound results returned.  No evidence of torsion or mass.  Patient declines pelvic exam at this time. /significant other at beside and concurs. Understands to follow-up with primary care doctor next week and return if any or worsening symptoms develop.      Procedures         Results for orders placed or performed during the hospital encounter of 24 (from the past 24 hour(s))   US Pelvis Complete without Transvaginal    Narrative    PROCEDURE: US PELVIC TRANSABDOMINAL  2024 4:50 PM    HISTORY: Female, age 38 years, . Female with presents today with acute  onset of abdominal pain.  Referred for ultrasound to rule out ovarian  torsion.  Rule out ovarian torsion status post hysterectomy.    GYNECOLOGIC HISTORY:  , PARA ; LMP    TECHNIQUE: Transabdominal color, grayscale, Color duplex/Doppler  ultrasound of the " pelvis.    COMPARISON: Pelvic ultrasound 2/24/2016    FINDINGS:     MEASUREMENTS:  Right Ovary: 3.5 x 2.8 x 2.2 cm  Normal blood flow.  Left Ovary:  3.3 x 2.2 x 2.3 cm  Normal blood flow.    UTERUS: Surgically absent.    ADNEXA: Unremarkable.    MISC: No free fluid.      Impression    IMPRESSION:   No evidence of torsion or apparent mass.    No acute abnormality.    STEPHY KUMAR MD         SYSTEM ID:  [id number]       Medications - No data to display    Assessments & Plan (with Medical Decision Making)     38-year-old female referred to our ER for an ultrasound due to complaints of abdominal pain.  Was seen at the outside institution and had a workup including labs and a CT scan that did not reveal any exact diagnosis for patient's symptoms.  CT did reveal some an ileus.  It was not felt that CT findings were the definitive cause of patient's symptoms and referred to the ER for an ultrasound to rule out a pelvic mass or ovarian torsion.  Reportedly patient has a history of appendectomy, hysterectomy and cholecystectomy.      Ultrasound today is unremarkable.  Patient did receive some pain medication in the form of fentanyl.  Serial abdominal exams did not show any significant findings.  At this point patient is going to be discharged home.  Understands that she may follow-up with her primary care doctor.  Understands to return if she develops any new or worsening symptoms.      New Prescriptions    No medications on file       Final diagnoses:   Abdominal pain, epigastric       8/24/2024   [facility] AND [facility]       Jocelyn Byrne MD  08/24/24 [id number]

## 2024-08-24 NOTE — ED TRIAGE NOTES
"ED Nursing Triage Note (General)   ________________________________    Bev Shi is a 38 year old Female that presents to triage via private vehicle from [place] for US. Patient states she was seen in DR earlier today and received a CT scan, UA, and blood work.   is unable to complete US due to availability and transferred patient to [facility] to rule out ovarian torsean.  Patient states she was woken with abdominal pain at 0600 which has persisted since then.  Patient states she was given fentanyl and dilaudid while at previous facility which helped pain, however, did not resolve it.  Patient states pain is primarily located on the LLQ. Patient denies urine changes, bleeding, or changes in discharge. Diarrhea reported yesterday, nausea today. No discoloration to diarrhea is reported. Patient states surgical hx of appendectomy, cholecystectomy, and hysterectomy. Patient states hx of pyelonephritis with stent placement.  Patient states pain is beginning to radiate into L) flank.   Significant symptoms had onset 8 hour(s) ago.  /67   Pulse 63   Temp 98.1  F (36.7  C) (Tympanic)   Resp 20   Ht 1.676 m (5' 6\")   Wt 54.9 kg (121 lb)   LMP  (LMP Unknown)   SpO2 96%   BMI 19.53 kg/m    Vital signs:  Temp: 98.1  F (36.7  C) Temp src: Tympanic BP: 105/67 Pulse: 63   Resp: 20 SpO2: 96 %     Height: 167.6 cm (5' 6\") Weight: 54.9 kg (121 lb)  Estimated body mass index is 19.53 kg/m  as calculated from the following:    Height as of this encounter: 1.676 m (5' 6\").    Weight as of this encounter: 54.9 kg (121 lb).  PRE HOSPITAL PRIOR LIVING SITUATION Spouse      Triage Assessment (Adult)       Row Name 08/24/24 0111          Triage Assessment    Airway WDL WDL        Respiratory WDL    Respiratory WDL WDL        Skin Circulation/Temperature WDL    Skin Circulation/Temperature WDL WDL        Cardiac WDL    Cardiac WDL WDL     Cardiac Rhythm NSR        Peripheral/Neurovascular WDL    Peripheral " Neurovascular WDL WDL     Capillary Refill, General less than/equal to 3 secs        Cognitive/Neuro/Behavioral WDL    Cognitive/Neuro/Behavioral WDL WDL        July Coma Scale    Best Eye Response 4-->(E4) spontaneous     Best Motor Response 6-->(M6) obeys commands     Best Verbal Response 5-->(V5) oriented     [place] Coma Scale Score 15

## 2024-08-24 NOTE — DISCHARGE INSTRUCTIONS
1) Follow the aftercare instructions provided.  2) You must follow up with your doctor on Monday for a recheck.  3) You have been given a medication or prescription for a medication that can make you drowsy. Do not drink, drive, ride a bicycle or operate any heavy machinery while using this medication.   4) If you develop any new or worsening symptoms return to the ER for recheck.

## 2024-08-25 ENCOUNTER — HOSPITAL ENCOUNTER (EMERGENCY)
Facility: OTHER | Age: 39
Discharge: HOME | End: 2024-08-25
Attending: FAMILY MEDICINE | Admitting: FAMILY MEDICINE

## 2024-08-25 VITALS
OXYGEN SATURATION: 99 % | BODY MASS INDEX: 20.83 KG/M2 | SYSTOLIC BLOOD PRESSURE: 107 MMHG | DIASTOLIC BLOOD PRESSURE: 61 MMHG | HEART RATE: 56 BPM | TEMPERATURE: 97.4 F | WEIGHT: 125 LBS | HEIGHT: 65 IN | RESPIRATION RATE: 22 BRPM

## 2024-08-25 DIAGNOSIS — R10.84 GENERALIZED ABDOMINAL PAIN: ICD-10-CM

## 2024-08-25 LAB
ALBUMIN SERPL BCG-MCNC: 4.2 G/DL (ref 3.5–5.2)
ALP SERPL-CCNC: 51 U/L (ref 40–150)
ALT SERPL W P-5'-P-CCNC: 17 U/L (ref 0–50)
ANION GAP SERPL CALCULATED.3IONS-SCNC: 12 MMOL/L (ref 7–15)
AST SERPL W P-5'-P-CCNC: 22 U/L (ref 0–45)
BASOPHILS # BLD AUTO: 0 10E3/UL (ref 0–0.2)
BASOPHILS NFR BLD AUTO: 0 %
BILIRUB SERPL-MCNC: 0.4 MG/DL
BUN SERPL-MCNC: 11 MG/DL (ref 6–20)
CALCIUM SERPL-MCNC: 9.5 MG/DL (ref 8.8–10.4)
CHLORIDE SERPL-SCNC: 101 MMOL/L (ref 98–107)
CREAT SERPL-MCNC: 0.64 MG/DL (ref 0.51–0.95)
EGFRCR SERPLBLD CKD-EPI 2021: >90 ML/MIN/1.73M2
EOSINOPHIL # BLD AUTO: 0 10E3/UL (ref 0–0.7)
EOSINOPHIL NFR BLD AUTO: 0 %
ERYTHROCYTE [DISTWIDTH] IN BLOOD BY AUTOMATED COUNT: 11.5 % (ref 10–15)
GLUCOSE SERPL-MCNC: 135 MG/DL (ref 70–99)
HCO3 SERPL-SCNC: 22 MMOL/L (ref 22–29)
HCT VFR BLD AUTO: 43.2 % (ref 35–47)
HGB BLD-MCNC: 15.1 G/DL (ref 11.7–15.7)
HOLD SPECIMEN: NORMAL
IMM GRANULOCYTES # BLD: 0 10E3/UL
IMM GRANULOCYTES NFR BLD: 0 %
LIPASE SERPL-CCNC: 23 U/L (ref 13–60)
LYMPHOCYTES # BLD AUTO: 1.3 10E3/UL (ref 0.8–5.3)
LYMPHOCYTES NFR BLD AUTO: 9 %
MCH RBC QN AUTO: 32.3 PG (ref 26.5–33)
MCHC RBC AUTO-ENTMCNC: 35 G/DL (ref 31.5–36.5)
MCV RBC AUTO: 93 FL (ref 78–100)
MONOCYTES # BLD AUTO: 0.6 10E3/UL (ref 0–1.3)
MONOCYTES NFR BLD AUTO: 5 %
NEUTROPHILS # BLD AUTO: 11.5 10E3/UL (ref 1.6–8.3)
NEUTROPHILS NFR BLD AUTO: 86 %
NRBC # BLD AUTO: 0 10E3/UL
NRBC BLD AUTO-RTO: 0 /100
PLATELET # BLD AUTO: 251 10E3/UL (ref 150–450)
POTASSIUM SERPL-SCNC: 3.7 MMOL/L (ref 3.4–5.3)
PROT SERPL-MCNC: 7.2 G/DL (ref 6.4–8.3)
RBC # BLD AUTO: 4.67 10E6/UL (ref 3.8–5.2)
SODIUM SERPL-SCNC: 135 MMOL/L (ref 135–145)
WBC # BLD AUTO: 13.5 10E3/UL (ref 4–11)

## 2024-08-25 PROCEDURE — 250N000011 HC RX IP 250 OP 636: Performed by: FAMILY MEDICINE

## 2024-08-25 PROCEDURE — 85025 COMPLETE CBC W/AUTO DIFF WBC: CPT | Performed by: FAMILY MEDICINE

## 2024-08-25 PROCEDURE — 258N000003 HC RX IP 258 OP 636: Performed by: FAMILY MEDICINE

## 2024-08-25 PROCEDURE — 83690 ASSAY OF LIPASE: CPT | Performed by: FAMILY MEDICINE

## 2024-08-25 PROCEDURE — 99284 EMERGENCY DEPT VISIT MOD MDM: CPT | Mod: 25 | Performed by: FAMILY MEDICINE

## 2024-08-25 PROCEDURE — 96374 THER/PROPH/DIAG INJ IV PUSH: CPT | Performed by: FAMILY MEDICINE

## 2024-08-25 PROCEDURE — 80053 COMPREHEN METABOLIC PANEL: CPT | Performed by: FAMILY MEDICINE

## 2024-08-25 PROCEDURE — 99284 EMERGENCY DEPT VISIT MOD MDM: CPT | Performed by: FAMILY MEDICINE

## 2024-08-25 PROCEDURE — 36415 COLL VENOUS BLD VENIPUNCTURE: CPT | Performed by: FAMILY MEDICINE

## 2024-08-25 PROCEDURE — 96361 HYDRATE IV INFUSION ADD-ON: CPT | Performed by: FAMILY MEDICINE

## 2024-08-25 PROCEDURE — 96375 TX/PRO/DX INJ NEW DRUG ADDON: CPT | Performed by: FAMILY MEDICINE

## 2024-08-25 RX ORDER — DIPHENHYDRAMINE HYDROCHLORIDE 50 MG/ML
25 INJECTION INTRAMUSCULAR; INTRAVENOUS ONCE
Status: COMPLETED | OUTPATIENT
Start: 2024-08-25 | End: 2024-08-25

## 2024-08-25 RX ORDER — METOCLOPRAMIDE HYDROCHLORIDE 5 MG/ML
10 INJECTION INTRAMUSCULAR; INTRAVENOUS ONCE
Status: COMPLETED | OUTPATIENT
Start: 2024-08-25 | End: 2024-08-25

## 2024-08-25 RX ADMIN — METOCLOPRAMIDE 10 MG: 5 INJECTION, SOLUTION INTRAMUSCULAR; INTRAVENOUS at 02:55

## 2024-08-25 RX ADMIN — DIPHENHYDRAMINE HYDROCHLORIDE 25 MG: 50 INJECTION INTRAMUSCULAR; INTRAVENOUS at 01:13

## 2024-08-25 RX ADMIN — FAMOTIDINE 20 MG: 10 INJECTION, SOLUTION INTRAVENOUS at 01:12

## 2024-08-25 RX ADMIN — SODIUM CHLORIDE 1000 ML: 9 INJECTION, SOLUTION INTRAVENOUS at 01:10

## 2024-08-25 RX ADMIN — PROCHLORPERAZINE EDISYLATE 10 MG: 5 INJECTION INTRAMUSCULAR; INTRAVENOUS at 01:13

## 2024-08-25 ASSESSMENT — ENCOUNTER SYMPTOMS
FEVER: 0
FLANK PAIN: 0
CHILLS: 0
CHEST TIGHTNESS: 0
NAUSEA: 1
ABDOMINAL PAIN: 1
VOMITING: 0

## 2024-08-25 ASSESSMENT — ACTIVITIES OF DAILY LIVING (ADL)
ADLS_ACUITY_SCORE: 35

## 2024-08-25 ASSESSMENT — COLUMBIA-SUICIDE SEVERITY RATING SCALE - C-SSRS
6. HAVE YOU EVER DONE ANYTHING, STARTED TO DO ANYTHING, OR PREPARED TO DO ANYTHING TO END YOUR LIFE?: NO
2. HAVE YOU ACTUALLY HAD ANY THOUGHTS OF KILLING YOURSELF IN THE PAST MONTH?: NO
1. IN THE PAST MONTH, HAVE YOU WISHED YOU WERE DEAD OR WISHED YOU COULD GO TO SLEEP AND NOT WAKE UP?: NO

## 2024-08-25 NOTE — ED PROVIDER NOTES
History     Chief Complaint   Patient presents with    Abdominal Pain    Nausea     HPI  Bev Shi is a 38 year old female who was in the ED earlier today.  I spoke with ED provider in [place], reviewed records there.  She had a CT there and an ultrasound here at [facility].  CT showed a mild ileus, ultrasound was negative for torsed ovary.  Patient was feeling somewhat better and discharged home but once she got home she started to feel worse again.  She called [place] to see what they should do and he told her to come here.  Reviewed nurses notes below, similar history is related to me.      Patient presents with abd pain and nausea.  Was seen In [place] and was told to come here for recurrent pain by Dr Ambrose in [place].  Patient did have ultrasound and CT scan today as well.     Allergies:  Allergies   Allergen Reactions    Hydrocodone Shortness Of Breath    Amoxicillin Rash    Codeine Nausea    Iodinated Contrast Media Rash     Pt developed red rash on chest and hands from [facility] for MRI scan.    Recommend premed prior scans (benadryl)    Pt developed red rash on chest and hands from [facility] for MRI scan.   Recommend premed prior scans (benadryl)    8/24/24. Received IV CT Contrast with no adverse reactions. Was premedicated with 10mg Decadron, 25mg Benadryl.    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     Priority: Medium    Moderate recurrent major depression (H) 01/02/2019     Priority: Medium    Panic disorder with agoraphobia 01/25/2016     Priority: Medium    Vitamin D insufficiency 12/28/2015     Priority: Medium    Anxiety 12/07/2015     Priority: Medium    Migraine without aura 07/09/2015     Priority: Medium    GERD (gastroesophageal  reflux disease) 04/30/2014     Priority: Medium    H/O atrial septal defect repair 04/04/2014     Priority: Medium        Past Medical History:    Past Medical History:   Diagnosis Date    Atrial septal defect     Breast lump     Pain in elbow     Personal history of other medical treatment (CODE)     Streptococcal pharyngitis        Past Surgical History:    Past Surgical History:   Procedure Laterality Date    APPENDECTOMY OPEN      3/10/15,Appendectomy    LAPAROSCOPIC HYSTERECTOMY TOTAL, SALPINGECTOMY  03/29/2016    Left.  Dr. Martinez    LAPAROSCOPIC TUBAL LIGATION      3/10/15    OTHER SURGICAL HISTORY      11/98,268064,OTHER,repair of a sinus/venus type atrioseptal defect    OTHER SURGICAL HISTORY      2013,[id number],OTHER,Dr. Martinez, [facility]    OTHER SURGICAL HISTORY      3/10/2015,37768.0,ID LAP TUBAL CAUTERY    OTHER SURGICAL HISTORY      3/14/2015,995248,OTHER       Family History:    No family history on file.    Social History:  Marital Status:   [2]  Social History     Tobacco Use    Smoking status: Light Smoker     Current packs/day: 0.10     Types: Cigarettes    Smokeless tobacco: Never    Tobacco comments:     working on [employer]   Vaping Use    Vaping status: Never Used   Substance Use Topics    Alcohol use: Yes     Alcohol/week: 0.0 standard drinks of alcohol     Comment: Alcoholic Drinks/day: occ    Drug use: No     Comment: Drug use: No        Medications:    promethazine (PHENERGAN) 50 MG suppository  aluminum chloride (DRYSOL) 20 % external solution  Ascorbic Acid (VITAMIN C) 500 MG CHEW  cholecalciferol (VITAMIN D3) 25 mcg (1000 units) capsule  citalopram (CELEXA) 40 MG tablet  clonazePAM (KLONOPIN) 0.5 MG tablet  dicyclomine (BENTYL) 10 MG capsule  diphenhydrAMINE (BENADRYL) 50 MG capsule  ELDERBERRY PO  methylPREDNISolone (MEDROL) 32 MG tablet  omeprazole (PRILOSEC) 20 MG DR capsule  ondansetron (ZOFRAN ODT) 4 MG ODT tab  valACYclovir (VALTREX) 1000 mg tablet  zinc gluconate 50 MG  "tablet          Review of Systems   Constitutional:  Negative for chills and fever.   Respiratory:  Negative for chest tightness.    Gastrointestinal:  Positive for abdominal pain and nausea. Negative for vomiting.   Genitourinary:  Negative for flank pain.       Physical Exam   BP: 116/81  Pulse: 84  Temp: 97.4  F (36.3  C)  Resp: 22  Height: 165.1 cm (5' 5\")  Weight: 56.7 kg (125 lb)  SpO2: 99 %      Physical Exam  Vitals and nursing note reviewed.   Constitutional:       General: She is not in acute distress.     Appearance: She is not diaphoretic.   HENT:      Head: Atraumatic.   Eyes:      Pupils: Pupils are equal, round, and reactive to light.   Cardiovascular:      Rate and Rhythm: Regular rhythm.      Heart sounds: Normal heart sounds.   Pulmonary:      Effort: No respiratory distress.      Breath sounds: Normal breath sounds.   Chest:      Chest wall: No tenderness.   Abdominal:      General: Bowel sounds are normal.      Palpations: Abdomen is soft.      Tenderness: There is generalized abdominal tenderness.   Musculoskeletal:         General: No tenderness. Normal range of motion.      Cervical back: No tenderness.      Thoracic back: No tenderness.      Lumbar back: No tenderness.   Skin:     Findings: No abrasion or laceration.   Neurological:      Mental Status: She is alert and oriented to person, place, and time.       mild generalized tenderness, serial abdominal exams are reassuring here in the ED,   No distention, normal bowel sounds      Results for orders placed or performed during the hospital encounter of 08/25/24 (from the past 24 hour(s))   [place] Draw    Narrative    The following orders were created for panel order [place] Draw.  Procedure                               Abnormality         Status                     ---------                               -----------         ------                     Extra Blue Top Tube[[id number]]                              Final result             "   Extra Red Top Tube[[id number]]                               Final result               Extra Green Top (Lithium...[[id number]]                      Final result               Extra Purple Top Tube[[id number]]                            Final result               Extra Green Top (Lithium...[[id number]]                      Final result                 Please view results for these tests on the individual orders.   Extra Blue Top Tube   Result Value Ref Range    Hold Specimen JIC    Extra Red Top Tube   Result Value Ref Range    Hold Specimen JIC    Extra Green Top (Lithium Heparin) Tube   Result Value Ref Range    Hold Specimen JIC    Extra Purple Top Tube   Result Value Ref Range    Hold Specimen JIC    Extra Green Top (Lithium Heparin) ON ICE   Result Value Ref Range    Hold Specimen JIC    CBC with platelets differential    Narrative    The following orders were created for panel order CBC with platelets differential.  Procedure                               Abnormality         Status                     ---------                               -----------         ------                     CBC with platelets and d...[[id number]]  Abnormal            Final result                 Please view results for these tests on the individual orders.   Lipase   Result Value Ref Range    Lipase 23 13 - 60 U/L   Comprehensive metabolic panel   Result Value Ref Range    Sodium 135 135 - 145 mmol/L    Potassium 3.7 3.4 - 5.3 mmol/L    Carbon Dioxide (CO2) 22 22 - 29 mmol/L    Anion Gap 12 7 - 15 mmol/L    Urea Nitrogen 11.0 6.0 - 20.0 mg/dL    Creatinine 0.64 0.51 - 0.95 mg/dL    GFR Estimate >90 >60 mL/min/1.73m2    Calcium 9.5 8.8 - 10.4 mg/dL    Chloride 101 98 - 107 mmol/L    Glucose 135 (H) 70 - 99 mg/dL    Alkaline Phosphatase 51 40 - 150 U/L    AST 22 0 - 45 U/L    ALT 17 0 - 50 U/L    Protein Total 7.2 6.4 - 8.3 g/dL    Albumin 4.2 3.5 - 5.2 g/dL    Bilirubin Total 0.4 <=1.2 mg/dL   CBC with platelets and differential    Result Value Ref Range    WBC Count 13.5 (H) 4.0 - 11.0 10e3/uL    RBC Count 4.67 3.80 - 5.20 10e6/uL    Hemoglobin 15.1 11.7 - 15.7 g/dL    Hematocrit 43.2 35.0 - 47.0 %    MCV 93 78 - 100 fL    MCH 32.3 26.5 - 33.0 pg    MCHC 35.0 31.5 - 36.5 g/dL    RDW 11.5 10.0 - 15.0 %    Platelet Count 251 150 - 450 10e3/uL    % Neutrophils 86 %    % Lymphocytes 9 %    % Monocytes 5 %    % Eosinophils 0 %    % Basophils 0 %    % Immature Granulocytes 0 %    NRBCs per 100 WBC 0 <1 /100    Absolute Neutrophils 11.5 (H) 1.6 - 8.3 10e3/uL    Absolute Lymphocytes 1.3 0.8 - 5.3 10e3/uL    Absolute Monocytes 0.6 0.0 - 1.3 10e3/uL    Absolute Eosinophils 0.0 0.0 - 0.7 10e3/uL    Absolute Basophils 0.0 0.0 - 0.2 10e3/uL    Absolute Immature Granulocytes 0.0 <=0.4 10e3/uL    Absolute NRBCs 0.0 10e3/uL       Medications   metoclopramide (REGLAN) injection 10 mg (has no administration in time range)   sodium chloride 0.9% BOLUS 1,000 mL (1,000 mLs Intravenous $New Bag 8/25/24 0110)   prochlorperazine (COMPAZINE) injection 10 mg (10 mg Intravenous $Given 8/25/24 0113)   diphenhydrAMINE (BENADRYL) injection 25 mg (25 mg Intravenous $Given 8/25/24 0113)   famotidine (PEPCID) injection 20 mg (20 mg Intravenous $Given 8/25/24 0112)       Assessments & Plan (with Medical Decision Making)     I have reviewed the nursing notes.    I have reviewed the findings, diagnosis, plan and need for follow up with the patient.    1:49 AM--I just checked on Bev, she is feeling quite a bit better after Compazine and Benadryl still getting her fluid bolus.  No development of abdominal distention.,  Bowel sounds negative.  No localization of abdominal tenderness.   minimal generalized tenderness, subjectively improved from previous exam, certainly no worse.    Medical Decision Making  The patient's presentation was of moderate complexity (a chronic illness mild to moderate exacerbation, progression, or side effect of treatment).    The patient's evaluation  involved:  review of 2 test result(s) ordered prior to this encounter (see separate area of note for details)  ordering and/or review of 2 test(s) in this encounter (see separate area of note for details)  independent interpretation of testing performed by another health professional (see separate area of note for details)    The patient's management necessitated high risk (a decision regarding hospitalization).    Reassuring clinical trajectory here in the ED.  Reassuring workup here in the ED, slightly increased white count from before but patient has a history of running slightly high white counts.  This of course is nonspecific and after shared decision making it will not prompt us to obtain repeat imaging.  Patient already had a CT and an ultrasound today.  Much improved with antiemetics here in the ED.  Given possible ileus and hypoactivity of bowels would recommend against any narcotics at this time.  They verbalized understanding of that and are in agreement.  Trial of Phenergan suppositories at home.  Reassuring serial examinations after shared decision-making conversation, patient and family agree that no further interventions or diagnostics are definitively indicated at this time and she feels like she can go home.  Hemodynamically stable at discharge.  Follow-up with primary, return to ED with localization of abdominal pain any fever over 100.4, abdominal distention or persistent vomiting without bowel movement.    New Prescriptions    PROMETHAZINE (PHENERGAN) 50 MG SUPPOSITORY    Place 0.5 suppositories (25 mg) rectally every 6 hours as needed for nausea.       Final diagnoses:   Generalized abdominal pain       8/25/2024   [facility] AND [facility]       Kwaku Frank MD  08/25/24 4017

## 2024-08-25 NOTE — ED TRIAGE NOTES
Patient presents with abd pain and nausea.  Was seen In DR today and was told to come here for recurrent pain by Dr Ambrose in [place].  Patient did have ultrasound and CT scan today as well.     Triage Assessment (Adult)       Row Name 08/25/24 0029          Triage Assessment    Airway WDL WDL        Respiratory WDL    Respiratory WDL WDL        Skin Circulation/Temperature WDL    Skin Circulation/Temperature WDL WDL        Cardiac WDL    Cardiac WDL WDL        Peripheral/Neurovascular WDL    Peripheral Neurovascular WDL WDL        Cognitive/Neuro/Behavioral WDL    Cognitive/Neuro/Behavioral WDL WDL        July Coma Scale    Best Eye Response 4-->(E4) spontaneous     Best Motor Response 6-->(M6) obeys commands     Best Verbal Response 5-->(V5) oriented     [place] Coma Scale Score 15

## 2025-07-19 ENCOUNTER — HEALTH MAINTENANCE LETTER (OUTPATIENT)
Age: 40
End: 2025-07-19

## (undated) RX ORDER — ONDANSETRON 4 MG/1
TABLET, ORALLY DISINTEGRATING ORAL
Status: DISPENSED
Start: 2024-08-24

## (undated) RX ORDER — FENTANYL CITRATE 50 UG/ML
INJECTION, SOLUTION INTRAMUSCULAR; INTRAVENOUS
Status: DISPENSED
Start: 2024-08-24

## (undated) RX ORDER — METOCLOPRAMIDE HYDROCHLORIDE 5 MG/ML
INJECTION INTRAMUSCULAR; INTRAVENOUS
Status: DISPENSED
Start: 2024-08-25

## (undated) RX ORDER — DIPHENHYDRAMINE HYDROCHLORIDE 50 MG/ML
INJECTION INTRAMUSCULAR; INTRAVENOUS
Status: DISPENSED
Start: 2024-08-25